# Patient Record
Sex: FEMALE | Race: WHITE | Employment: FULL TIME | URBAN - METROPOLITAN AREA
[De-identification: names, ages, dates, MRNs, and addresses within clinical notes are randomized per-mention and may not be internally consistent; named-entity substitution may affect disease eponyms.]

---

## 2017-02-08 ENCOUNTER — HOSPITAL ENCOUNTER (OUTPATIENT)
Dept: LAB | Age: 27
Discharge: HOME | End: 2017-02-08
Payer: COMMERCIAL

## 2017-02-08 ENCOUNTER — TELEPHONE (OUTPATIENT)
Dept: FAMILY MEDICINE CLINIC | Age: 27
End: 2017-02-08

## 2017-02-08 DIAGNOSIS — R61 NIGHT SWEATS: Primary | ICD-10-CM

## 2017-02-08 DIAGNOSIS — E04.9 ENLARGED THYROID: ICD-10-CM

## 2017-02-08 DIAGNOSIS — R61 NIGHT SWEATS: ICD-10-CM

## 2017-02-08 LAB — TSH SERPL DL<=0.05 MIU/L-ACNC: 1.1 UIU/ML (ref 0.36–3.74)

## 2017-02-08 PROCEDURE — 84443 ASSAY THYROID STIM HORMONE: CPT | Performed by: FAMILY MEDICINE

## 2017-05-23 ENCOUNTER — TELEPHONE (OUTPATIENT)
Dept: FAMILY MEDICINE CLINIC | Age: 27
End: 2017-05-23

## 2017-05-23 DIAGNOSIS — M25.572 ACUTE LEFT ANKLE PAIN: Primary | ICD-10-CM

## 2017-06-19 ENCOUNTER — TELEPHONE (OUTPATIENT)
Dept: FAMILY MEDICINE CLINIC | Age: 27
End: 2017-06-19

## 2017-06-19 ENCOUNTER — CLINICAL SUPPORT (OUTPATIENT)
Dept: FAMILY MEDICINE CLINIC | Age: 27
End: 2017-06-19

## 2017-06-19 ENCOUNTER — HOSPITAL ENCOUNTER (OUTPATIENT)
Dept: LAB | Age: 27
Discharge: HOME | End: 2017-06-19
Payer: SELF-PAY

## 2017-06-19 DIAGNOSIS — Z01.818 PRE-OP TESTING: Primary | ICD-10-CM

## 2017-06-19 DIAGNOSIS — Z01.818 PRE-OP TESTING: ICD-10-CM

## 2017-06-19 LAB
ANION GAP BLD CALC-SCNC: 8 MMOL/L (ref 3–18)
BUN SERPL-MCNC: 12 MG/DL (ref 7–18)
BUN/CREAT SERPL: 15 (ref 12–20)
CALCIUM SERPL-MCNC: 9.1 MG/DL (ref 8.5–10.1)
CHLORIDE SERPL-SCNC: 105 MMOL/L (ref 100–108)
CO2 SERPL-SCNC: 24 MMOL/L (ref 21–32)
CREAT SERPL-MCNC: 0.82 MG/DL (ref 0.6–1.3)
ERYTHROCYTE [DISTWIDTH] IN BLOOD BY AUTOMATED COUNT: 12.3 % (ref 11.6–14.5)
GLUCOSE SERPL-MCNC: 81 MG/DL (ref 74–99)
HCT VFR BLD AUTO: 42.2 % (ref 35–45)
HGB BLD-MCNC: 14.1 G/DL (ref 12–16)
MCH RBC QN AUTO: 31.6 PG (ref 24–34)
MCHC RBC AUTO-ENTMCNC: 33.4 G/DL (ref 31–37)
MCV RBC AUTO: 94.6 FL (ref 74–97)
PLATELET # BLD AUTO: 216 K/UL (ref 135–420)
PMV BLD AUTO: 11.2 FL (ref 9.2–11.8)
POTASSIUM SERPL-SCNC: 4.5 MMOL/L (ref 3.5–5.5)
RBC # BLD AUTO: 4.46 M/UL (ref 4.2–5.3)
SODIUM SERPL-SCNC: 137 MMOL/L (ref 136–145)
WBC # BLD AUTO: 9.6 K/UL (ref 4.6–13.2)

## 2017-06-19 PROCEDURE — 85027 COMPLETE CBC AUTOMATED: CPT | Performed by: FAMILY MEDICINE

## 2017-06-19 PROCEDURE — 80048 BASIC METABOLIC PNL TOTAL CA: CPT | Performed by: FAMILY MEDICINE

## 2017-06-19 PROCEDURE — 36415 COLL VENOUS BLD VENIPUNCTURE: CPT | Performed by: FAMILY MEDICINE

## 2017-08-07 ENCOUNTER — HOSPITAL ENCOUNTER (OUTPATIENT)
Dept: ULTRASOUND IMAGING | Age: 27
Discharge: HOME | End: 2017-08-07
Attending: FAMILY MEDICINE
Payer: COMMERCIAL

## 2017-08-07 DIAGNOSIS — R61 NIGHT SWEATS: ICD-10-CM

## 2017-08-07 DIAGNOSIS — E04.9 ENLARGED THYROID: ICD-10-CM

## 2017-08-07 PROCEDURE — 76536 US EXAM OF HEAD AND NECK: CPT

## 2017-08-17 DIAGNOSIS — F41.9 ANXIETY: ICD-10-CM

## 2017-08-17 RX ORDER — ALPRAZOLAM 1 MG/1
1 TABLET ORAL
Qty: 30 TAB | Refills: 0 | Status: SHIPPED | OUTPATIENT
Start: 2017-08-17 | End: 2017-12-21 | Stop reason: SDUPTHER

## 2017-11-28 ENCOUNTER — TELEPHONE (OUTPATIENT)
Dept: FAMILY MEDICINE CLINIC | Age: 27
End: 2017-11-28

## 2017-11-28 DIAGNOSIS — E04.9 GOITER, EUTHYROID: Primary | ICD-10-CM

## 2017-11-29 LAB
T4 FREE SERPL-MCNC: 1.3 NG/DL (ref 0.9–1.8)
TSH SERPL-ACNC: 1.46 MCU/ML (ref 0.27–4.2)

## 2017-12-11 ENCOUNTER — OFFICE VISIT (OUTPATIENT)
Dept: FAMILY MEDICINE CLINIC | Age: 27
End: 2017-12-11

## 2017-12-11 VITALS
SYSTOLIC BLOOD PRESSURE: 110 MMHG | OXYGEN SATURATION: 100 % | RESPIRATION RATE: 17 BRPM | BODY MASS INDEX: 28.17 KG/M2 | DIASTOLIC BLOOD PRESSURE: 66 MMHG | HEIGHT: 64 IN | WEIGHT: 165 LBS | TEMPERATURE: 97.7 F | HEART RATE: 82 BPM

## 2017-12-11 DIAGNOSIS — R11.2 NON-INTRACTABLE VOMITING WITH NAUSEA, UNSPECIFIED VOMITING TYPE: Primary | ICD-10-CM

## 2017-12-11 DIAGNOSIS — R10.9 ABDOMINAL CRAMPING: ICD-10-CM

## 2017-12-11 RX ORDER — KETOROLAC TROMETHAMINE 30 MG/ML
30 INJECTION, SOLUTION INTRAMUSCULAR; INTRAVENOUS ONCE
Qty: 1 VIAL | Refills: 0
Start: 2017-12-11 | End: 2017-12-11

## 2017-12-11 NOTE — PROGRESS NOTES
HISTORY OF PRESENT ILLNESS  Marizol Tirado is a 33 y.o. female who presents to center for nausea and vomiting since this morning. She woke up about 3 am this morning with being very nauseaus and lower abdominal cramping and moderate epigastric pain. She had a very loose BM that lasted about 15 minutes and shortly afterwards she vomited. She has not had any more BMs since then but has continued the constantly mildly nauseous with intermittent frequent bouts of increased nausea that caused her to vomit. She thinks that she has vomited 6 or 7 times since this morning and the last time she vomited was almost 2 hours ago. Several the time that she vomited when she had drank some water previously in order to keep hydrated. Denies being exposed to any welts that she is aware of that has the same symptoms or has been sick, has not eaten any different foods that she would normally eat or eaten at different restaurant in the last 24 hours. Nausea    The history is provided by the patient. The current episode started 3 to 5 hours ago. The problem has not changed since onset. The emesis has an appearance of bilious material. There has been no fever. Associated symptoms include abdominal pain (mild abdominal cramping pain that gets worse when very nauseous prior to vomiting). Pertinent negatives include no fever. The patient is not pregnant. Visit Vitals    /66 (BP 1 Location: Right arm, BP Patient Position: Sitting)  Comment: manual    Pulse 82    Temp 97.7 °F (36.5 °C) (Oral)    Resp 17    Ht 5' 4\" (1.626 m)    Wt 165 lb (74.8 kg)    LMP 12/10/2017 (Exact Date)    SpO2 100%    BMI 28.32 kg/m2     Past Medical History:   Diagnosis Date    Anxiety     Depression     Eating disorder      Current Outpatient Prescriptions on File Prior to Visit   Medication Sig Dispense Refill    ALPRAZolam (XANAX) 1 mg tablet Take 1 Tab by mouth nightly as needed for Anxiety.  Max Daily Amount: 1 mg. 30 Tab 0    hydrOXYzine (ATARAX) 25 mg tablet Take one tab at hs/PRN 30 Tab 2     No current facility-administered medications on file prior to visit. No current facility-administered medications on file prior to visit. Allergies   Allergen Reactions    Amoxicillin Rash       Review of Systems   Constitutional: Negative for fever. Respiratory: Negative for shortness of breath. Cardiovascular: Negative for chest pain and palpitations. Gastrointestinal: Positive for abdominal pain (mild abdominal cramping pain that gets worse when very nauseous prior to vomiting), nausea and vomiting. Genitourinary: Negative for dysuria and urgency. Physical Exam   Constitutional:   appears tired with mild pallor; no distress   Cardiovascular: Normal rate, regular rhythm, normal heart sounds and intact distal pulses. Pulmonary/Chest: Effort normal and breath sounds normal.   Abdominal: Soft. She exhibits no distension. There is tenderness (mid upper). ASSESSMENT and PLAN    ICD-10-CM ICD-9-CM    1. Non-intractable vomiting with nausea, unspecified vomiting type R11.2 787.01    2. Abdominal cramping R10.9 789.00 ketorolac (TORADOL) 30 mg/mL (1 mL) injection     Patient given Toradol injection to help with the abdominal discomfort/cramping so when she gets home that she is able to not have any increased nausea or vomiting after she takes Zofran that she has at home. She is to make sure she can still hydrate herself and she is to return to center if symptoms are not resolving in 24-48 hours. Any worsening symptoms she is to go to the emergency department. Patient verbalized understanding and agreed with plan.

## 2017-12-21 DIAGNOSIS — F41.9 ANXIETY: ICD-10-CM

## 2017-12-21 RX ORDER — ALPRAZOLAM 1 MG/1
1 TABLET ORAL
Qty: 30 TAB | Refills: 2 | Status: SHIPPED | OUTPATIENT
Start: 2017-12-21 | End: 2018-05-24 | Stop reason: SDUPTHER

## 2017-12-21 NOTE — TELEPHONE ENCOUNTER
Requested Prescriptions     Pending Prescriptions Disp Refills    ALPRAZolam (XANAX) 1 mg tablet 30 Tab 2     Sig: Take 1 Tab by mouth nightly as needed for Anxiety. Max Daily Amount: 1 mg.

## 2018-01-09 ENCOUNTER — OFFICE VISIT (OUTPATIENT)
Dept: FAMILY MEDICINE CLINIC | Age: 28
End: 2018-01-09

## 2018-01-09 VITALS
OXYGEN SATURATION: 96 % | HEART RATE: 113 BPM | WEIGHT: 159 LBS | SYSTOLIC BLOOD PRESSURE: 138 MMHG | HEIGHT: 64 IN | TEMPERATURE: 96.3 F | RESPIRATION RATE: 17 BRPM | BODY MASS INDEX: 27.14 KG/M2 | DIASTOLIC BLOOD PRESSURE: 77 MMHG

## 2018-01-09 DIAGNOSIS — R11.15 INTRACTABLE CYCLICAL VOMITING WITH NAUSEA: ICD-10-CM

## 2018-01-09 DIAGNOSIS — Z11.3 SCREENING FOR STD (SEXUALLY TRANSMITTED DISEASE): ICD-10-CM

## 2018-01-09 DIAGNOSIS — F41.9 ANXIETY: Primary | ICD-10-CM

## 2018-01-09 PROBLEM — F33.9 RECURRENT DEPRESSION (HCC): Status: ACTIVE | Noted: 2018-01-09

## 2018-01-09 RX ORDER — ONDANSETRON 8 MG/1
8 TABLET, ORALLY DISINTEGRATING ORAL
Qty: 10 TAB | Refills: 1 | Status: SHIPPED | OUTPATIENT
Start: 2018-01-09 | End: 2018-09-21

## 2018-01-09 RX ORDER — FLUOXETINE HYDROCHLORIDE 20 MG/1
20 CAPSULE ORAL DAILY
Qty: 30 CAP | Refills: 2 | Status: SHIPPED | OUTPATIENT
Start: 2018-01-09 | End: 2018-09-21

## 2018-01-09 RX ORDER — LAMOTRIGINE 25 MG/1
25 TABLET ORAL DAILY
Qty: 30 TAB | Refills: 2 | Status: SHIPPED | OUTPATIENT
Start: 2018-01-09 | End: 2018-09-21

## 2018-01-09 NOTE — PATIENT INSTRUCTIONS
Anxiety Disorder: Care Instructions  Your Care Instructions    Anxiety is a normal reaction to stress. Difficult situations can cause you to have symptoms such as sweaty palms and a nervous feeling. In an anxiety disorder, the symptoms are far more severe. Constant worry, muscle tension, trouble sleeping, nausea and diarrhea, and other symptoms can make normal daily activities difficult or impossible. These symptoms may occur for no reason, and they can affect your work, school, or social life. Medicines, counseling, and self-care can all help. Follow-up care is a key part of your treatment and safety. Be sure to make and go to all appointments, and call your doctor if you are having problems. It's also a good idea to know your test results and keep a list of the medicines you take. How can you care for yourself at home? · Take medicines exactly as directed. Call your doctor if you think you are having a problem with your medicine. · Go to your counseling sessions and follow-up appointments. · Recognize and accept your anxiety. Then, when you are in a situation that makes you anxious, say to yourself, \"This is not an emergency. I feel uncomfortable, but I am not in danger. I can keep going even if I feel anxious. \"  · Be kind to your body:  ¨ Relieve tension with exercise or a massage. ¨ Get enough rest.  ¨ Avoid alcohol, caffeine, nicotine, and illegal drugs. They can increase your anxiety level and cause sleep problems. ¨ Learn and do relaxation techniques. See below for more about these techniques. · Engage your mind. Get out and do something you enjoy. Go to a funny movie, or take a walk or hike. Plan your day. Having too much or too little to do can make you anxious. · Keep a record of your symptoms. Discuss your fears with a good friend or family member, or join a support group for people with similar problems. Talking to others sometimes relieves stress.   · Get involved in social groups, or volunteer to help others. Being alone sometimes makes things seem worse than they are. · Get at least 30 minutes of exercise on most days of the week to relieve stress. Walking is a good choice. You also may want to do other activities, such as running, swimming, cycling, or playing tennis or team sports. Relaxation techniques  Do relaxation exercises 10 to 20 minutes a day. You can play soothing, relaxing music while you do them, if you wish. · Tell others in your house that you are going to do your relaxation exercises. Ask them not to disturb you. · Find a comfortable place, away from all distractions and noise. · Lie down on your back, or sit with your back straight. · Focus on your breathing. Make it slow and steady. · Breathe in through your nose. Breathe out through either your nose or mouth. · Breathe deeply, filling up the area between your navel and your rib cage. Breathe so that your belly goes up and down. · Do not hold your breath. · Breathe like this for 5 to 10 minutes. Notice the feeling of calmness throughout your whole body. As you continue to breathe slowly and deeply, relax by doing the following for another 5 to 10 minutes:  · Tighten and relax each muscle group in your body. You can begin at your toes and work your way up to your head. · Imagine your muscle groups relaxing and becoming heavy. · Empty your mind of all thoughts. · Let yourself relax more and more deeply. · Become aware of the state of calmness that surrounds you. · When your relaxation time is over, you can bring yourself back to alertness by moving your fingers and toes and then your hands and feet and then stretching and moving your entire body. Sometimes people fall asleep during relaxation, but they usually wake up shortly afterward. · Always give yourself time to return to full alertness before you drive a car or do anything that might cause an accident if you are not fully alert.  Never play a relaxation tape while you drive a car. When should you call for help? Call 911 anytime you think you may need emergency care. For example, call if:  ? · You feel you cannot stop from hurting yourself or someone else. ? Keep the numbers for these national suicide hotlines: 7-953-771-TALK (6-762-632--336-420-6023) and 2-151-IQFRGBQ (2-335.332.7976). If you or someone you know talks about suicide or feeling hopeless, get help right away. ? Watch closely for changes in your health, and be sure to contact your doctor if:  ? · You have anxiety or fear that affects your life. ? · You have symptoms of anxiety that are new or different from those you had before. Where can you learn more? Go to http://sarah beth-aylin.info/. Enter P754 in the search box to learn more about \"Anxiety Disorder: Care Instructions. \"  Current as of: May 12, 2017  Content Version: 11.4  © 5540-3530 [employer]. Care instructions adapted under license by [employer] (which disclaims liability or warranty for this information). If you have questions about a medical condition or this instruction, always ask your healthcare professional. [address] any warranty or liability for your use of this information.

## 2018-01-09 NOTE — PROGRESS NOTES
Christina Fung is a 33 y.o. female presents to office for anxiety      1.  Have you been to the ER, urgent care clinic or hospitalized since your last visit? no          Health Maintenance items with a due date reviewed with patient:  Health Maintenance Due   Topic Date Due    HPV AGE 9Y-34Y (1 of 3 - Female 3 Dose Series) 04/12/2002    DTaP/Tdap/Td series (2 - Td) 07/22/2015    PAP AKA CERVICAL CYTOLOGY  06/24/2017    Influenza Age 9 to Adult  08/01/2017

## 2018-01-10 NOTE — PROGRESS NOTES
Randi Zhu is a 33 y.o.  female and presents with signs and symptoms of possible cyclic vomiting syndrome with nausea since   Childhood. She also is F/U for anxiety, depression. Chief Complaint   Patient presents with    Anxiety    Vomiting     Subjective: Additional Concerns: none     Patient Active Problem List    Diagnosis Date Noted    Recurrent depression ([address] 01/09/2018    Tongue plaque 09/14/2016    Anxiety 05/12/2016    Depression 05/12/2016    Weight gain 05/12/2016     Current Outpatient Prescriptions   Medication Sig Dispense Refill    FLUoxetine (PROZAC) 20 mg capsule Take 1 Cap by mouth daily. Indications: ANXIETY WITH DEPRESSION 30 Cap 2    lamoTRIgine (LAMICTAL) 25 mg tablet Take 1 Tab by mouth daily. Indications: anxiety 30 Tab 2    ondansetron (ZOFRAN ODT) 8 mg disintegrating tablet Take 1 Tab by mouth every twelve (12) hours as needed for Nausea. 10 Tab 1    ALPRAZolam (XANAX) 1 mg tablet Take 1 Tab by mouth nightly as needed for Anxiety.  Max Daily Amount: 1 mg. 30 Tab 2     Allergies   Allergen Reactions    Amoxicillin Rash     Past Medical History:   Diagnosis Date    Anxiety     Depression     Eating disorder      Past Surgical History:   Procedure Laterality Date    HX ANKLE FRACTURE TX Left     HX PELVIC LAPAROSCOPY      HX WISDOM TEETH EXTRACTION       Family History   Problem Relation Age of Onset    Cancer Maternal Grandmother     Cancer Maternal Grandfather     Cancer Paternal Grandmother     Cancer Paternal Grandfather      Social History   Substance Use Topics    Smoking status: Former Smoker     Types: Cigarettes    Smokeless tobacco: Never Used    Alcohol use 0.0 oz/week     0 Standard drinks or equivalent per week     ROS     General: negative for - chills, fatigue, fever, weight change  Psych: positive for - anxiety, depression, irritability or mood swings  Resp: negative for - cough, shortness of breath or wheezing  CV: negative for - chest pain, edema or palpitations  GI: negative for - abdominal pain, change in bowel habits, constipation, diarrhea, positive nausea/vomiting    Objective:  Vitals:    01/09/18 1232   BP: 138/77   Pulse: (!) 113   Resp: 17   Temp: 96.3 °F (35.7 °C)   TempSrc: Oral   SpO2: 96%   Weight: 159 lb (72.1 kg)   Height: 5' 4\" (1.626 m)   PainSc:   0 - No pain   LMP: 12/10/2017     PE    Alert, well appearing, and in no distress, oriented to person, place, and time and overweight  General appearance - alert, well appearing, and in no distress, oriented to person, place, and time and overweight  Mental status - alert, oriented to person, place, and time, normal mood, behavior, speech, dress, motor activity, and thought processes  Chest - clear to auscultation, no wheezes, rales or rhonchi, symmetric air entry  Heart - normal rate, regular rhythm, normal S1, S2, no murmurs, rubs, clicks or gallops  Extremities - peripheral pulses normal, no pedal edema, no clubbing or cyanosis    LABS   Orders Only on 11/28/2017   Component Date Value Ref Range Status    T4, Free 11/28/2017 1.3  0.9 - 1.8 ng/dL Final    TSH 11/28/2017 1.46  0.27 - 4.20 mcU/mL Final    Test includes TSH. T4 Free and T3 Free will be tested as needed. TESTS  Results for orders placed or performed in visit on 11/28/17   T4, FREE   Result Value Ref Range    T4, Free 1.3 0.9 - 1.8 ng/dL   THYROID CASCADE PROFILE   Result Value Ref Range    TSH 1.46 0.27 - 4.20 mcU/mL     Assessment/Plan:      1. Anxiety/depression - Restart  - FLUoxetine (PROZAC) 20 mg capsule; Take 1 Cap by mouth daily. Indications: ANXIETY WITH DEPRESSION  Dispense: 30 Cap; Refill: 2  Trial of  - lamoTRIgine (LAMICTAL) 25 mg tablet; Take 1 Tab by mouth daily. Indications: anxiety  Dispense: 30 Tab; Refill: 2    2. Intractable cyclical vomiting with nausea, possible cyclic vomiting syndrome - As above also. - ondansetron (ZOFRAN ODT) 8 mg disintegrating tablet;  Take 1 Tab by mouth every twelve (12) hours as needed for Nausea. Dispense: 10 Tab; Refill: 1  Patient will see GI for possible CVS work up and ruling out other differential from GI standpoint. Lab review: no lab studies available for review at time of visit. I have discussed the diagnosis with the patient and the intended plan as seen in the above orders. The patient has received an after-visit summary and questions were answered concerning future plans. I have discussed medication side effects and warnings with the patient as well. I have reviewed the plan of care with the patient, accepted their input and they are in agreement with the treatment goals. Follow-up Disposition:  Return in about 1 month (around 2/9/2018), or if symptoms worsen or fail to improve.     Negra Telles MD

## 2018-01-23 DIAGNOSIS — Z11.3 SCREENING FOR STD (SEXUALLY TRANSMITTED DISEASE): ICD-10-CM

## 2018-01-29 LAB
CHLAMYDIA AMPLIFIED URINE: NEGATIVE
GC AMPLIFIED URINE,919: NEGATIVE
HCV AB SER IA-ACNC: NORMAL
HEP B SURFACE AG SCRN, 006510: NORMAL
HIV -1/0/2 AG/AB WITH REFLEX, 13463: NON REACTIVE
HIV 1 & 2 AB SER-IMP: NORMAL
TREPONEMA PALLIDUM AB: NON REACTIVE

## 2018-03-29 ENCOUNTER — OFFICE VISIT (OUTPATIENT)
Dept: FAMILY MEDICINE CLINIC | Age: 28
End: 2018-03-29

## 2018-03-29 DIAGNOSIS — R00.0 TACHYCARDIA: Primary | ICD-10-CM

## 2018-03-29 NOTE — PROGRESS NOTES
HISTORY OF PRESENT ILLNESS  Bradley Bruno is a 33 y.o. female here for the evaluation of tachycardia. Patient states that she developed tachycardia yesterday while sitting at desk. Heart rate was as fast as 136 bpm.  She became diaphoretic. There was no chest pain or shortness of breath. Patient states that  this happens approximately once weekly for the past 2 years. Palpitations    The history is provided by the patient. This is a recurrent problem. The problem has not changed since onset. The problem occurs every several days. The problem is associated with an unknown factor. Associated symptoms include diaphoresis, malaise/fatigue and numbness. Pertinent negatives include no chest pain, no chest pressure, no exertional chest pressure, no irregular heartbeat, no near-syncope, no nausea, no vomiting, no headaches, no dizziness, no hemoptysis and no shortness of breath. Associated symptoms comments: He complains of numbness in her teeth during episode. Risk factors include smoking/tobacco exposure and family history. She has tried prescription medication (Xanax) for the symptoms. The treatment provided moderate relief. Her past medical history does not include hyperthyroidism, valve disorder, anemia, heart disease, DM, past MI, hypertension, atrial fibrillation, SVT, congenital heart disease or drug use. Allergies   Allergen Reactions    Amoxicillin Rash     Current Outpatient Prescriptions on File Prior to Visit   Medication Sig Dispense Refill    FLUoxetine (PROZAC) 20 mg capsule Take 1 Cap by mouth daily. Indications: ANXIETY WITH DEPRESSION 30 Cap 2    lamoTRIgine (LAMICTAL) 25 mg tablet Take 1 Tab by mouth daily. Indications: anxiety 30 Tab 2    ondansetron (ZOFRAN ODT) 8 mg disintegrating tablet Take 1 Tab by mouth every twelve (12) hours as needed for Nausea. 10 Tab 1    ALPRAZolam (XANAX) 1 mg tablet Take 1 Tab by mouth nightly as needed for Anxiety.  Max Daily Amount: 1 mg. 30 Tab 2     No current facility-administered medications on file prior to visit. Past Medical History:   Diagnosis Date    Anxiety     Depression     Eating disorder      Past Surgical History:   Procedure Laterality Date    HX ANKLE FRACTURE TX Left     HX PELVIC LAPAROSCOPY      HX WISDOM TEETH EXTRACTION       Family History   Problem Relation Age of Onset    Cancer Maternal Grandmother     Cancer Maternal Grandfather     Cancer Paternal Grandmother     Cancer Paternal Grandfather      Social History     Social History    Marital status: UNKNOWN     Spouse name: N/A    Number of children: N/A    Years of education: N/A     Occupational History    Not on file. Social History Main Topics    Smoking status: Former Smoker     Types: Cigarettes    Smokeless tobacco: Never Used    Alcohol use 0.0 oz/week     0 Standard drinks or equivalent per week    Drug use: No    Sexual activity: Yes     Birth control/ protection: Pill     Other Topics Concern    Not on file     Social History Narrative       Review of Systems   Constitutional: Positive for diaphoresis and malaise/fatigue. Eyes: Negative. Respiratory: Negative. Negative for hemoptysis and shortness of breath. Cardiovascular: Positive for palpitations. Negative for chest pain and near-syncope. Gastrointestinal: Negative for nausea and vomiting. Musculoskeletal: Negative. Neurological: Positive for numbness. Negative for dizziness and headaches. Endo/Heme/Allergies: Negative. Psychiatric/Behavioral: Negative. Visit Vitals    /74 (BP 1 Location: Left arm, BP Patient Position: Sitting)    Pulse (!) 130    Temp 98.6 °F (37 °C) (Oral)    Resp 16    Ht 5' 4\" (1.626 m)    Wt 150 lb (68 kg)    SpO2 98%    BMI 25.75 kg/m2       Physical Exam   Constitutional: She is oriented to person, place, and time. She appears well-developed and well-nourished. HENT:   Head: Normocephalic and atraumatic.    Cardiovascular: Normal rate, regular rhythm, normal heart sounds and intact distal pulses. Exam reveals no gallop and no friction rub. No murmur heard. Pulmonary/Chest: Effort normal and breath sounds normal. No respiratory distress. She has no wheezes. She has no rales. Musculoskeletal: Normal range of motion. She exhibits no edema, tenderness or deformity. Neurological: She is alert and oriented to person, place, and time. No cranial nerve deficit. Coordination normal.   Skin: Skin is warm and dry. No rash noted. No erythema. No pallor. Psychiatric: She has a normal mood and affect. Her behavior is normal. Judgment and thought content normal.   Nursing note and vitals reviewed. ASSESSMENT and PLAN    ICD-10-CM ICD-9-CM    1. Tachycardia R00.0 785.0 EVENT MONITOR POST SYMPTOMS      CBC WITH AUTOMATED DIFF      TSH 3RD GENERATION      REFERRAL TO CARDIOLOGY     Follow-up Disposition:  Return if symptoms worsen or fail to improve.

## 2018-04-02 VITALS
BODY MASS INDEX: 25.61 KG/M2 | WEIGHT: 150 LBS | DIASTOLIC BLOOD PRESSURE: 74 MMHG | RESPIRATION RATE: 16 BRPM | HEIGHT: 64 IN | HEART RATE: 130 BPM | SYSTOLIC BLOOD PRESSURE: 120 MMHG | OXYGEN SATURATION: 98 % | TEMPERATURE: 98.6 F

## 2018-04-02 PROBLEM — R00.0 TACHYCARDIA: Status: ACTIVE | Noted: 2018-04-02

## 2018-04-03 ENCOUNTER — TELEPHONE (OUTPATIENT)
Dept: CARDIOLOGY CLINIC | Age: 28
End: 2018-04-03

## 2018-04-03 LAB
ABSOLUTE LYMPHOCYTE COUNT, 10803: 3.7 K/UL (ref 1–4.8)
BASOPHILS # BLD: 0 K/UL (ref 0–0.2)
BASOPHILS NFR BLD: 0 % (ref 0–2)
EOSINOPHIL # BLD: 0.1 K/UL (ref 0–0.5)
EOSINOPHIL NFR BLD: 1 % (ref 0–6)
ERYTHROCYTE [DISTWIDTH] IN BLOOD BY AUTOMATED COUNT: 13.2 % (ref 10–15.5)
GRANULOCYTES,GRANS: 68 % (ref 40–75)
HCT VFR BLD AUTO: 44.9 % (ref 35.1–46.5)
HGB BLD-MCNC: 14.9 G/DL (ref 11.7–15.5)
LYMPHOCYTES, LYMLT: 24 % (ref 20–45)
MCH RBC QN AUTO: 32 PG (ref 26–34)
MCHC RBC AUTO-ENTMCNC: 33 G/DL (ref 31–36)
MCV RBC AUTO: 97 FL (ref 80–95)
MONOCYTES # BLD: 1.1 K/UL (ref 0.1–1)
MONOCYTES NFR BLD: 7 % (ref 3–12)
NEUTROPHILS # BLD AUTO: 10.4 K/UL (ref 1.8–7.7)
PLATELET # BLD AUTO: 231 K/UL (ref 140–440)
PMV BLD AUTO: 11.5 FL (ref 9–13)
RBC # BLD AUTO: 4.63 M/UL (ref 3.8–5.2)
TSH SERPL DL<=0.005 MIU/L-ACNC: 2.3 MCU/ML (ref 0.27–4.2)
WBC # BLD AUTO: 15.3 K/UL (ref 4–11)

## 2018-04-10 ENCOUNTER — TELEPHONE (OUTPATIENT)
Dept: FAMILY MEDICINE CLINIC | Age: 28
End: 2018-04-10

## 2018-04-17 ENCOUNTER — TELEPHONE (OUTPATIENT)
Dept: CARDIOLOGY CLINIC | Age: 28
End: 2018-04-17

## 2018-04-17 NOTE — TELEPHONE ENCOUNTER
Patient called and states that she had abnormal EKG and that Dr. Bebe Huynh would like her to have a 30 day event monitor and see Dr. Brian Orellana for follow up. She has been waiting to hear from company for event monitor but has not as of now. Verbal order and read back per Iraj De La Rosa MD  [facility] to order 30 day event monitor through prevent so that he will see results and follow up with him once event [place] is completed.      Patient aware and enrollment is set up

## 2018-05-24 DIAGNOSIS — F41.9 ANXIETY: ICD-10-CM

## 2018-05-24 RX ORDER — ALPRAZOLAM 1 MG/1
1 TABLET ORAL
Qty: 60 TAB | Refills: 2 | Status: SHIPPED | OUTPATIENT
Start: 2018-05-24 | End: 2018-12-18 | Stop reason: SDUPTHER

## 2018-05-24 NOTE — TELEPHONE ENCOUNTER
Can you please sign order    Requested Prescriptions     Pending Prescriptions Disp Refills    ALPRAZolam (XANAX) 1 mg tablet 60 Tab 2     Sig: Take 1 Tab by mouth two (2) times daily as needed for Anxiety. Max Daily Amount: 2 mg.

## 2018-07-03 ENCOUNTER — HOSPITAL ENCOUNTER (OUTPATIENT)
Dept: ULTRASOUND IMAGING | Age: 28
Discharge: HOME | End: 2018-07-03
Attending: OBSTETRICS & GYNECOLOGY
Payer: COMMERCIAL

## 2018-07-03 DIAGNOSIS — N63.10 UNSPECIFIED LUMP IN THE RIGHT BREAST, UNSPECIFIED QUADRANT: ICD-10-CM

## 2018-07-03 PROCEDURE — 76642 ULTRASOUND BREAST LIMITED: CPT

## 2018-07-03 RX ORDER — FLUCONAZOLE 150 MG/1
150 TABLET ORAL DAILY
Qty: 3 TAB | Refills: 1 | Status: SHIPPED | OUTPATIENT
Start: 2018-07-03 | End: 2018-07-04

## 2018-07-03 NOTE — TELEPHONE ENCOUNTER
Please order    Requested Prescriptions     Pending Prescriptions Disp Refills    fluconazole (DIFLUCAN) 150 mg tablet 3 Tab 1     Sig: Take 1 Tab by mouth daily for 1 day. FDA advises cautious prescribing of oral fluconazole in pregnancy.

## 2018-07-20 ENCOUNTER — HOSPITAL ENCOUNTER (OUTPATIENT)
Dept: MAMMOGRAPHY | Age: 28
Discharge: HOME | End: 2018-07-20
Attending: OBSTETRICS & GYNECOLOGY
Payer: COMMERCIAL

## 2018-07-20 ENCOUNTER — HOSPITAL ENCOUNTER (OUTPATIENT)
Dept: ULTRASOUND IMAGING | Age: 28
Discharge: HOME | End: 2018-07-20
Attending: OBSTETRICS & GYNECOLOGY
Payer: COMMERCIAL

## 2018-07-20 DIAGNOSIS — Z98.890 STATUS POST RIGHT BREAST BIOPSY: ICD-10-CM

## 2018-07-20 DIAGNOSIS — N63.10 BREAST MASS, RIGHT: ICD-10-CM

## 2018-07-20 PROCEDURE — 77030020003 US BX BREAST VAC RT 1ST LESION W/CLIP AND SPECIMEN

## 2018-07-20 PROCEDURE — 74011000250 HC RX REV CODE- 250: Performed by: RADIOLOGY

## 2018-07-20 PROCEDURE — 77065 DX MAMMO INCL CAD UNI: CPT

## 2018-07-20 PROCEDURE — 88305 TISSUE EXAM BY PATHOLOGIST: CPT | Performed by: OBSTETRICS & GYNECOLOGY

## 2018-07-20 RX ORDER — LIDOCAINE HYDROCHLORIDE 10 MG/ML
10 INJECTION INFILTRATION; PERINEURAL
Status: COMPLETED | OUTPATIENT
Start: 2018-07-20 | End: 2018-07-20

## 2018-07-20 RX ORDER — LIDOCAINE HYDROCHLORIDE AND EPINEPHRINE 10; 10 MG/ML; UG/ML
10 INJECTION, SOLUTION INFILTRATION; PERINEURAL
Status: COMPLETED | OUTPATIENT
Start: 2018-07-20 | End: 2018-07-20

## 2018-07-20 RX ADMIN — LIDOCAINE HYDROCHLORIDE 4 ML: 10 INJECTION, SOLUTION INFILTRATION; PERINEURAL at 09:10

## 2018-07-20 RX ADMIN — LIDOCAINE HYDROCHLORIDE,EPINEPHRINE BITARTRATE 10 ML: 10; .01 INJECTION, SOLUTION INFILTRATION; PERINEURAL at 09:24

## 2018-07-26 RX ORDER — LAMOTRIGINE 100 MG/1
100 TABLET ORAL DAILY
Qty: 90 TAB | Refills: 1 | Status: SHIPPED | OUTPATIENT
Start: 2018-07-26 | End: 2019-02-05

## 2018-09-04 ENCOUNTER — OFFICE VISIT (OUTPATIENT)
Dept: FAMILY MEDICINE CLINIC | Age: 28
End: 2018-09-04

## 2018-09-04 DIAGNOSIS — R31.9 URINARY TRACT INFECTION WITH HEMATURIA, SITE UNSPECIFIED: ICD-10-CM

## 2018-09-04 DIAGNOSIS — N39.0 URINARY TRACT INFECTION WITH HEMATURIA, SITE UNSPECIFIED: ICD-10-CM

## 2018-09-04 DIAGNOSIS — M54.40 MIDLINE LOW BACK PAIN WITH SCIATICA, SCIATICA LATERALITY UNSPECIFIED, UNSPECIFIED CHRONICITY: Primary | ICD-10-CM

## 2018-09-04 DIAGNOSIS — N89.8 VAGINAL DISCHARGE: ICD-10-CM

## 2018-09-04 LAB
BILIRUB UR QL STRIP: NEGATIVE
GLUCOSE UR-MCNC: NEGATIVE MG/DL
KETONES P FAST UR STRIP-MCNC: NEGATIVE MG/DL
PH UR STRIP: 5.5 [PH] (ref 4.6–8)
PROT UR QL STRIP: NORMAL
SP GR UR STRIP: 1.03 (ref 1–1.03)
UA UROBILINOGEN AMB POC: NORMAL (ref 0.2–1)
URINALYSIS CLARITY POC: CLEAR
URINALYSIS COLOR POC: YELLOW
URINE BLOOD POC: NORMAL
URINE LEUKOCYTES POC: NORMAL
URINE NITRITES POC: NEGATIVE

## 2018-09-04 RX ORDER — SULFAMETHOXAZOLE AND TRIMETHOPRIM 800; 160 MG/1; MG/1
1 TABLET ORAL 2 TIMES DAILY
Qty: 14 TAB | Refills: 0 | Status: SHIPPED | OUTPATIENT
Start: 2018-09-04 | End: 2018-09-11

## 2018-09-04 NOTE — PROGRESS NOTES
Iris Pal is a 33 y.o. female presents to office for BACK PAIN    Medication list has been reviewed with Iris Pal and updated as of today's date     Health Maintenance items with a due date reviewed with patient:  Health Maintenance Due   Topic Date Due    DTaP/Tdap/Td series (2 - Td) 07/22/2015    PAP AKA CERVICAL CYTOLOGY  06/24/2017    Influenza Age 9 to Adult  08/01/2018

## 2018-09-05 LAB
CHLAMYDIA AMPLIFIED URINE: NEGATIVE
GC AMPLIFIED URINE,919: NEGATIVE

## 2018-09-05 NOTE — PROGRESS NOTES
Elda Ramos is a 33 y.o.  female and presents with a day hx of low back pain mostly right sided with vaginal discharge that is baseline. Patient is sexually active with one partner. Pinkish tinged urine but not currently on her period. Chief Complaint   Patient presents with    Back Pain     Subjective: Additional Concerns: none    Patient Active Problem List    Diagnosis Date Noted    Tachycardia 04/02/2018    Recurrent depression ([address] 01/09/2018    Tongue plaque 09/14/2016    Anxiety 05/12/2016    Depression 05/12/2016    Weight gain 05/12/2016     Current Outpatient Prescriptions   Medication Sig Dispense Refill    trimethoprim-sulfamethoxazole (BACTRIM DS, SEPTRA DS) 160-800 mg per tablet Take 1 Tab by mouth two (2) times a day for 7 days. 14 Tab 0    lamoTRIgine (LAMICTAL) 100 mg tablet Take 1 Tab by mouth daily. Indications: DEPRESSION ASSOCIATED WITH BIPOLAR DISORDER 90 Tab 1    ALPRAZolam (XANAX) 1 mg tablet Take 1 Tab by mouth two (2) times daily as needed for Anxiety. Max Daily Amount: 2 mg. 60 Tab 2    FLUoxetine (PROZAC) 20 mg capsule Take 1 Cap by mouth daily. Indications: ANXIETY WITH DEPRESSION 30 Cap 2    lamoTRIgine (LAMICTAL) 25 mg tablet Take 1 Tab by mouth daily. Indications: anxiety 30 Tab 2    ondansetron (ZOFRAN ODT) 8 mg disintegrating tablet Take 1 Tab by mouth every twelve (12) hours as needed for Nausea.  10 Tab 1     Allergies   Allergen Reactions    Amoxicillin Rash     Past Medical History:   Diagnosis Date    Anxiety     Depression     Eating disorder      Past Surgical History:   Procedure Laterality Date    HX ANKLE FRACTURE TX Left     HX BREAST BIOPSY Right 07/20/2018    Ultrasound Biopsy    HX PELVIC LAPAROSCOPY      HX WISDOM TEETH EXTRACTION       Family History   Problem Relation Age of Onset    Cancer Maternal Grandmother     Cancer Maternal Grandfather     Cancer Paternal Grandmother     Cancer Paternal Grandfather     Breast Cancer Paternal Aunt     Breast Cancer Paternal Aunt     Breast Cancer Paternal Aunt     Breast Cancer Paternal Aunt      Social History   Substance Use Topics    Smoking status: Former Smoker     Types: Cigarettes    Smokeless tobacco: Never Used    Alcohol use 0.0 oz/week     0 Standard drinks or equivalent per week     ROS     General: negative for - chills, fatigue, fever, weight change  Psych: negative for - anxiety, depression, irritability or mood swings  ENT: negative for - headaches, hearing change, nasal congestion, oral lesions, sneezing or sore throat  Heme/ Lymph: negative for - bleeding problems, bruising, pallor or swollen lymph nodes  Endo: negative for - hot flashes, polydipsia/polyuria or temperature intolerance  Resp: negative for - cough, shortness of breath or wheezing  CV: negative for - chest pain, edema or palpitations  GI: negative for - abdominal pain, change in bowel habits, constipation, diarrhea or nausea/vomiting  : negative for - dysuria, hematuria, incontinence, pelvic pain or vulvar/vaginal symptoms  MSK: negative for - joint pain, joint swelling or muscle pain  Neuro: negative for - confusion, headaches, seizures or weakness  Derm: negative for - dry skin, hair changes, rash or skin lesion changes    Objective:    PE    Alert, well appearing, and in no distress, oriented to person, place, and time and normal appearing weight  General appearance - alert, well appearing, and in no distress, oriented to person, place, and time and normal appearing weight  Mental status - alert, oriented to person, place, and time, normal mood, behavior, speech, dress, motor activity, and thought processes  No CVA tenderness bilateral    LABS   Office Visit on 09/04/2018   Component Date Value Ref Range Status    Color (UA POC) 09/04/2018 Yellow   Final    Clarity (UA POC) 09/04/2018 Clear   Final    Glucose (UA POC) 09/04/2018 Negative  Negative Final    Bilirubin (UA POC) 09/04/2018 Negative  Negative Final    Ketones (UA POC) 09/04/2018 Negative  Negative Final    Specific gravity (UA POC) 09/04/2018 1.030  1.001 - 1.035 Final    Blood (UA POC) 09/04/2018 3+  Negative Final    pH (UA POC) 09/04/2018 5.5  4.6 - 8.0 Final    Protein (UA POC) 09/04/2018 Trace  Negative Final    Urobilinogen (UA POC) 09/04/2018 0.2 mg/dL  0.2 - 1 Final    Nitrites (UA POC) 09/04/2018 Negative  Negative Final    Leukocyte esterase (UA POC) 09/04/2018 1+  Negative Final   Office Visit on 03/29/2018   Component Date Value Ref Range Status    TSH 04/02/2018 2.30  0.27 - 4.20 mcU/mL Final    WBC 04/02/2018 15.3* 4.0 - 11.0 K/uL Final    RBC 04/02/2018 4.63  3.80 - 5.20 M/uL Final    HGB 04/02/2018 14.9  11.7 - 15.5 g/dL Final    HCT 04/02/2018 44.9  35.1 - 46.5 % Final    MCV 04/02/2018 97* 80 - 95 fL Final    MCH 04/02/2018 32  26 - 34 pg Final    MCHC 04/02/2018 33  31 - 36 g/dL Final    RDW 04/02/2018 13.2  10.0 - 15.5 % Final    PLATELET 24/33/5987 842  140 - 440 K/uL Final    MPV 04/02/2018 11.5  9.0 - 13.0 fL Final    NEUTROPHILS 04/02/2018 68  40 - 75 % Final    Lymphocytes 04/02/2018 24  20 - 45 % Final    MONOCYTES 04/02/2018 7  3 - 12 % Final    EOSINOPHILS 04/02/2018 1  0 - 6 % Final    BASOPHILS 04/02/2018 0  0 - 2 % Final    ABS. NEUTROPHILS 04/02/2018 10.4* 1.8 - 7.7 K/uL Final    ABSOLUTE LYMPHOCYTE COUNT 04/02/2018 3.7  1.0 - 4.8 K/uL Final    ABS. MONOCYTES 04/02/2018 1.1* 0.1 - 1.0 K/uL Final    ABS. EOSINOPHILS 04/02/2018 0.1  0.0 - 0.5 K/uL Final    ABS.  BASOPHILS 04/02/2018 0.0  0.0 - 0.2 K/uL Final       TESTS  Results for orders placed or performed in visit on 09/04/18   AMB POC URINALYSIS DIP STICK AUTO W/O MICRO   Result Value Ref Range    Color (UA POC) Yellow     Clarity (UA POC) Clear     Glucose (UA POC) Negative Negative    Bilirubin (UA POC) Negative Negative    Ketones (UA POC) Negative Negative    Specific gravity (UA POC) 1.030 1.001 - 1.035    Blood (UA POC) 3+ Negative    pH (UA POC) 5.5 4.6 - 8.0    Protein (UA POC) Trace Negative    Urobilinogen (UA POC) 0.2 mg/dL 0.2 - 1    Nitrites (UA POC) Negative Negative    Leukocyte esterase (UA POC) 1+ Negative     Assessment/Plan:      1. Midline low back pain with sciatica, sciatica laterality unspecified, unspecified chronicity  - AMB POC URINALYSIS DIP STICK AUTO W/O MICRO    2. Urinary tract infection with hematuria, site unspecified first time apparently - empiric treatment with   - trimethoprim-sulfamethoxazole (BACTRIM DS, SEPTRA DS) 160-800 mg per tablet; Take 1 Tab by mouth two (2) times a day for 7 days. Dispense: 14 Tab; Refill: 0    3. Vaginal discharge  - CHLAMYDIA/NEISSERIA AMPLIFICATION; Future    Lab review: orders written for new lab studies as appropriate; see orders. I have discussed the diagnosis with the patient and the intended plan as seen in the above orders. The patient has received an after-visit summary and questions were answered concerning future plans. I have discussed medication side effects and warnings with the patient as well. I have reviewed the plan of care with the patient, accepted their input and they are in agreement with the treatment goals. F/U as needed.      Candace Brasher MD

## 2018-09-05 NOTE — PATIENT INSTRUCTIONS
Back Pain: Care Instructions  Your Care Instructions    Back pain has many possible causes. It is often related to problems with muscles and ligaments of the back. It may also be related to problems with the nerves, discs, or bones of the back. Moving, lifting, standing, sitting, or sleeping in an awkward way can strain the back. Sometimes you don't notice the injury until later. Arthritis is another common cause of back pain. Although it may hurt a lot, back pain usually improves on its own within several weeks. Most people recover in 12 weeks or less. Using good home treatment and being careful not to stress your back can help you feel better sooner. Follow-up care is a key part of your treatment and safety. Be sure to make and go to all appointments, and call your doctor if you are having problems. It's also a good idea to know your test results and keep a list of the medicines you take. How can you care for yourself at home? · Sit or lie in positions that are most comfortable and reduce your pain. Try one of these positions when you lie down:  ¨ Lie on your back with your knees bent and supported by large pillows. ¨ Lie on the floor with your legs on the seat of a sofa or chair. Jaciel Frames on your side with your knees and hips bent and a pillow between your legs. ¨ Lie on your stomach if it does not make pain worse. · Do not sit up in bed, and avoid soft couches and twisted positions. Bed rest can help relieve pain at first, but it delays healing. Avoid bed rest after the first day of back pain. · Change positions every 30 minutes. If you must sit for long periods of time, take breaks from sitting. Get up and walk around, or lie in a comfortable position. · Try using a heating pad on a low or medium setting for 15 to 20 minutes every 2 or 3 hours. Try a warm shower in place of one session with the heating pad. · You can also try an ice pack for 10 to 15 minutes every 2 to 3 hours.  Put a thin cloth between the ice pack and your skin. · Take pain medicines exactly as directed. ¨ If the doctor gave you a prescription medicine for pain, take it as prescribed. ¨ If you are not taking a prescription pain medicine, ask your doctor if you can take an over-the-counter medicine. · Take short walks several times a day. You can start with 5 to 10 minutes, 3 or 4 times a day, and work up to longer walks. Walk on level surfaces and avoid hills and stairs until your back is better. · Return to work and other activities as soon as you can. Continued rest without activity is usually not good for your back. · To prevent future back pain, do exercises to stretch and strengthen your back and stomach. Learn how to use good posture, safe lifting techniques, and proper body mechanics. When should you call for help? Call your doctor now or seek immediate medical care if:    · You have new or worsening numbness in your legs.     · You have new or worsening weakness in your legs. (This could make it hard to stand up.)     · You lose control of your bladder or bowels.    Watch closely for changes in your health, and be sure to contact your doctor if:    · You have a fever, lose weight, or don't feel well.     · You do not get better as expected. Where can you learn more? Go to http://sarah beth-aylin.info/. Enter V083 in the search box to learn more about \"Back Pain: Care Instructions. \"  Current as of: November 29, 2017  Content Version: 11.7  © 4498-5137 "[employer]. Care instructions adapted under license by [employer] (which disclaims liability or warranty for this information). If you have questions about a medical condition or this instruction, always ask your healthcare professional. [place] [address] any warranty or liability for your use of this information.        Flank Pain: Care Instructions  Your Care Instructions  Flank pain is pain on the side of the back just below the rib cage and above the waist. It can be on one or both sides. Flank pain has many possible causes, including a kidney stone, a urinary tract infection, or back strain. Flank pain may get better on its own. But don't ignore new symptoms, such as fever, nausea and vomiting, urination problems, pain that gets worse, and dizziness. These may be signs of a more serious problem. You may have to have tests or other treatment. Follow-up care is a key part of your treatment and safety. Be sure to make and go to all appointments, and call your doctor if you are having problems. It's also a good idea to know your test results and keep a list of the medicines you take. How can you care for yourself at home? · Rest until you feel better. · Take pain medicines exactly as directed. ¨ If the doctor gave you a prescription medicine for pain, take it as prescribed. ¨ If you are not taking a prescription pain medicine, ask your doctor if you can take an over-the-counter pain medicine, such as acetaminophen (Tylenol), ibuprofen (Advil, Motrin), or naproxen (Aleve). Read and follow all instructions on the label. · If your doctor prescribed antibiotics, take them as directed. Do not stop taking them just because you feel better. You need to take the full course of antibiotics. · To apply heat, put a warm water bottle, a heating pad set on low, or a warm cloth on the painful area. Do not go to sleep with a heating pad on your skin. · To prevent dehydration, drink plenty of fluids, enough so that your urine is light yellow or clear like water. Choose water and other caffeine-free clear liquids until you feel better. If you have kidney, heart, or liver disease and have to limit fluids, talk with your doctor before you increase the amount of fluids you drink. When should you call for help?   Call your doctor now or seek immediate medical care if:    · Your flank pain gets worse.     · You have new symptoms, such as fever, nausea, or vomiting.     · You have symptoms of a urinary problem. For example:  ¨ You have blood or pus in your urine. ¨ You have chills or body aches. ¨ It hurts to urinate. ¨ You have groin or belly pain.    Watch closely for changes in your health, and be sure to contact your doctor if you do not get better as expected. Where can you learn more? Go to http://sarah beth-aylin.info/. Enter S191 in the search box to learn more about \"Flank Pain: Care Instructions. \"  Current as of: November 20, 2017  Content Version: 11.7  © 1363-2542 [employer]. Care instructions adapted under license by [employer] (which disclaims liability or warranty for this information). If you have questions about a medical condition or this instruction, always ask your healthcare professional. [address] any warranty or liability for your use of this information. Blood in the Urine: Care Instructions  Your Care Instructions    Blood in the urine, or hematuria, may make the urine look red, brown, or pink. There may be blood every time you urinate or just from time to time. You cannot always see blood in the urine, but it will show up in a urine test.  Blood in the urine may be serious. It should always be checked by a doctor. Your doctor may recommend more tests, including an X-ray, a CT scan, or a cystoscopy (which lets a doctor look inside the urethra and bladder). Blood in the urine can be a sign of another problem. Common causes are bladder infections and kidney stones. An injury to your groin or your genital area can also cause bleeding in the urinary tract. Very hard exercise-such as running a marathon-can cause blood in the urine. Blood in the urine can also be a sign of kidney disease or cancer in the bladder or kidney. Many cases of blood in the urine are caused by a harmless condition that runs in families. This is called benign familial hematuria. It does not need any treatment. Sometimes your urine may look red or brown even though it does not contain blood. For example, not getting enough fluids (dehydration), taking certain medicines, or having a liver problem can change the color of your urine. Eating foods such as beets, rhubarb, or blackberries or foods with red food coloring can make your urine look red or pink. Follow-up care is a key part of your treatment and safety. Be sure to make and go to all appointments, and call your doctor if you are having problems. It's also a good idea to know your test results and keep a list of the medicines you take. When should you call for help? Call your doctor now or seek immediate medical care if:    · You have symptoms of a urinary infection. For example:  ¨ You have pus in your urine. ¨ You have pain in your back just below your rib cage. This is called flank pain. ¨ You have a fever, chills, or body aches. ¨ It hurts to urinate. ¨ You have groin or belly pain.     · You have more blood in your urine.    Watch closely for changes in your health, and be sure to contact your doctor if:    · You have new urination problems.     · You do not get better as expected. Where can you learn more? Go to http://sarah beth-aylin.info/. Enter J319 in the search box to learn more about \"Blood in the Urine: Care Instructions. \"  Current as of: May 12, 2017  Content Version: 11.7  © 7094-8401 [employer]. Care instructions adapted under license by [employer] (which disclaims liability or warranty for this information). If you have questions about a medical condition or this instruction, always ask your healthcare professional. [place] [address] any warranty or liability for your use of this information.

## 2018-09-21 ENCOUNTER — OFFICE VISIT (OUTPATIENT)
Dept: INTERNAL MEDICINE CLINIC | Age: 28
End: 2018-09-21

## 2018-09-21 VITALS
WEIGHT: 151 LBS | SYSTOLIC BLOOD PRESSURE: 132 MMHG | BODY MASS INDEX: 25.78 KG/M2 | RESPIRATION RATE: 18 BRPM | TEMPERATURE: 98.1 F | HEART RATE: 97 BPM | HEIGHT: 64 IN | OXYGEN SATURATION: 98 % | DIASTOLIC BLOOD PRESSURE: 74 MMHG

## 2018-09-21 DIAGNOSIS — F41.9 ANXIETY: Primary | ICD-10-CM

## 2018-09-21 DIAGNOSIS — R11.0 NAUSEA: ICD-10-CM

## 2018-09-21 DIAGNOSIS — F32.89 OTHER DEPRESSION: ICD-10-CM

## 2018-09-21 RX ORDER — ONDANSETRON HYDROCHLORIDE 8 MG/1
8 TABLET, FILM COATED ORAL
Qty: 30 TAB | Refills: 0 | Status: SHIPPED | OUTPATIENT
Start: 2018-09-21 | End: 2019-09-10 | Stop reason: SDUPTHER

## 2018-09-21 RX ORDER — BUSPIRONE HYDROCHLORIDE 7.5 MG/1
TABLET ORAL
Qty: 90 TAB | Refills: 0 | Status: SHIPPED | OUTPATIENT
Start: 2018-09-21 | End: 2018-10-19 | Stop reason: SDUPTHER

## 2018-09-21 NOTE — MR AVS SNAPSHOT
[address] [place] [address] [address] [address] 
[phone] Patient: Dean iMller MRN: [id number] :1990 Visit Information Date & Time Provider Department Dept. Phone Encounter #  
 2018  2:15 PM Jose Sheffield PA-C [facility] [phone] [id number] Follow-up Instructions Return in about 4 weeks (around 10/19/2018) for depression/anxiety. Upcoming Health Maintenance Date Due DTaP/Tdap/Td series (2 - Td) 2015 PAP AKA CERVICAL CYTOLOGY 2017 Influenza Age 5 to Adult 2018 Allergies as of 2018  Review Complete On: 2018 By: Jose Sheffield PA-C Severity Noted Reaction Type Reactions Amoxicillin  03/10/2016    Rash Current Immunizations  Never Reviewed Name Date Hep A Vaccine 2010, 2004 Hep B Vaccine 2002, 10/29/2001, 2001 Influenza Vaccine 10/7/2013, 2012, 2010, 10/21/2009 Meningococcal (MCV4) Vaccine 6/3/2008 Meningococcal ACWY Vaccine 2006 Tdap 2005 Not reviewed this visit You Were Diagnosed With   
  
 Codes Comments Anxiety    -  Primary ICD-10-CM: F41.9 ICD-9-CM: 300.00 Other depression     ICD-10-CM: F32.89 ICD-9-CM: 503 Nausea     ICD-10-CM: R11.0 ICD-9-CM: 787.02 Vitals BP Pulse Temp Resp Height(growth percentile) Weight(growth percentile) 132/74 (BP 1 Location: Left arm, BP Patient Position: Sitting) 97 98.1 °F (36.7 °C) (Oral) 18 5' 3.5\" (1.613 m) 151 lb (68.5 kg) LMP SpO2 BMI OB Status Smoking Status 2018 (Exact Date) 98% 26.33 kg/m2 Having regular periods Former Smoker Vitals History BMI and BSA Data Body Mass Index Body Surface Area  
 26.33 kg/m 2 1.75 m 2 Preferred Pharmacy Pharmacy Name Phone [id number] [place] [facility], [address] [phone] Your Updated Medication List  
  
   
This list is accurate as of 18 10:28 PM.  Always use your most recent med list.  
  
  
  
  
 ALPRAZolam 1 mg tablet Commonly known as:  Patsy Hearing Take 1 Tab by mouth two (2) times daily as needed for Anxiety. Max Daily Amount: 2 mg.  
  
 busPIRone 7.5 mg tablet Commonly known as:  BUSPAR  
1 tab bid for 2 weeks then increase to 1.5 tabs bid  
  
 lamoTRIgine 100 mg tablet Commonly known as: LaMICtal  
Take 1 Tab by mouth daily. Indications: DEPRESSION ASSOCIATED WITH BIPOLAR DISORDER  
  
 ondansetron hcl 8 mg tablet Commonly known as:  Radha Houston Take 1 Tab by mouth every eight (8) hours as needed for Nausea. Prescriptions Sent to Pharmacy Refills  
 busPIRone (BUSPAR) 7.5 mg tablet 0 Si tab bid for 2 weeks then increase to 1.5 tabs bid Class: Normal  
 Pharmacy: [facility], [address] [place] [address] Ph #: [phone]  
 ondansetron hcl (ZOFRAN) 8 mg tablet 0 Sig: Take 1 Tab by mouth every eight (8) hours as needed for Nausea. Class: Normal  
 Pharmacy: [address] [facility], [address] [facility] Ph #: [phone] Route: Oral  
  
Follow-up Instructions Return in about 4 weeks (around 10/19/2018) for depression/anxiety. Patient Instructions Anxiety Disorder: Care Instructions Your Care Instructions Anxiety is a normal reaction to stress. Difficult situations can cause you to have symptoms such as sweaty palms and a nervous feeling. In an anxiety disorder, the symptoms are far more severe. Constant worry, muscle tension, trouble sleeping, nausea and diarrhea, and other symptoms can make normal daily activities difficult or impossible. These symptoms may occur for no reason, and they can affect your work, school, or social life. Medicines, counseling, and self-care can all help. Follow-up care is a key part of your treatment and safety.  Be sure to make and go to all appointments, and call your doctor if you are having problems. It's also a good idea to know your test results and keep a list of the medicines you take. How can you care for yourself at home? · Take medicines exactly as directed. Call your doctor if you think you are having a problem with your medicine. · Go to your counseling sessions and follow-up appointments. · Recognize and accept your anxiety. Then, when you are in a situation that makes you anxious, say to yourself, \"This is not an emergency. I feel uncomfortable, but I am not in danger. I can keep going even if I feel anxious. \" · Be kind to your body: ¨ Relieve tension with exercise or a massage. ¨ Get enough rest. 
¨ Avoid alcohol, caffeine, nicotine, and illegal drugs. They can increase your anxiety level and cause sleep problems. ¨ Learn and do relaxation techniques. See below for more about these techniques. · Engage your mind. Get out and do something you enjoy. Go to a funny movie, or take a walk or hike. Plan your day. Having too much or too little to do can make you anxious. · Keep a record of your symptoms. Discuss your fears with a good friend or family member, or join a support group for people with similar problems. Talking to others sometimes relieves stress. · Get involved in social groups, or volunteer to help others. Being alone sometimes makes things seem worse than they are. · Get at least 30 minutes of exercise on most days of the week to relieve stress. Walking is a good choice. You also may want to do other activities, such as running, swimming, cycling, or playing tennis or team sports. Relaxation techniques Do relaxation exercises 10 to 20 minutes a day. You can play soothing, relaxing music while you do them, if you wish. · Tell others in your house that you are going to do your relaxation exercises. Ask them not to disturb you. · Find a comfortable place, away from all distractions and noise. · Lie down on your back, or sit with your back straight. · Focus on your breathing. Make it slow and steady. · Breathe in through your nose. Breathe out through either your nose or mouth. · Breathe deeply, filling up the area between your navel and your rib cage. Breathe so that your belly goes up and down. · Do not hold your breath. · Breathe like this for 5 to 10 minutes. Notice the feeling of calmness throughout your whole body. As you continue to breathe slowly and deeply, relax by doing the following for another 5 to 10 minutes: · Tighten and relax each muscle group in your body. You can begin at your toes and work your way up to your head. · Imagine your muscle groups relaxing and becoming heavy. · Empty your mind of all thoughts. · Let yourself relax more and more deeply. · Become aware of the state of calmness that surrounds you. · When your relaxation time is over, you can bring yourself back to alertness by moving your fingers and toes and then your hands and feet and then stretching and moving your entire body. Sometimes people fall asleep during relaxation, but they usually wake up shortly afterward. · Always give yourself time to return to full alertness before you drive a car or do anything that might cause an accident if you are not fully alert. Never play a relaxation tape while you drive a car. When should you call for help? Call 911 anytime you think you may need emergency care. For example, call if: 
  · You feel you cannot stop from hurting yourself or someone else.  
Freddie Ball the numbers for these national suicide hotlines: 8-095-526-TALK (5-807.417.9392) and 3-184-THSNKKI (6-382.335.1101). If you or someone you know talks about suicide or feeling hopeless, get help right away. 
 Watch closely for changes in your health, and be sure to contact your doctor if: 
  · You have anxiety or fear that affects your life.   · You have symptoms of anxiety that are new or different from those you had before. Where can you learn more? Go to http://sarah beth-aylin.info/. Enter P754 in the search box to learn more about \"Anxiety Disorder: Care Instructions. \" Current as of: December 7, 2017 Content Version: 11.7 © 9173-4150 [employer]. Care instructions adapted under license by [employer] (which disclaims liability or warranty for this information). If you have questions about a medical condition or this instruction, always ask your healthcare professional. [address] any warranty or liability for your use of this information. Introducing [facility]! Dear Sweetie Ang: Thank you for requesting a [employer] account. Our records indicate that you already have an active [employer] account. You can access your account anytime at [email or web address] Did you know that you can access your hospital and ER discharge instructions at any time in [employer]? You can also review all of your test results from your hospital stay or ER visit. Additional Information If you have questions, please visit the Frequently Asked Questions section of the [employer] website at [email or web address] Remember, [employer] is NOT to be used for urgent needs. For medical emergencies, dial 911. Now available from your iPhone and Android! Please provide this summary of care documentation to your next provider. Your primary care clinician is listed as Patricia Walden. If you have any questions after today's visit, please call [phone].

## 2018-09-21 NOTE — PROGRESS NOTES
Chief Complaint Patient presents with  Anxiety NP c/o anxiety and depression. Current medicaitons not helping. Was seeing atherapist, had an argument and is not going back. 1. Have you been to the ER, urgent care clinic since your last visit? Hospitalized since your last visit? No 
 
2. Have you seen or consulted any other health care providers outside of the [facility] since your last visit? Include any pap smears or colon screening.  Yes When: Therapy, Breast Specilaist for Lump Left Breast, GYN

## 2018-09-22 NOTE — PROGRESS NOTES
HISTORY OF PRESENT ILLNESS Rob Benton is a 33 y.o. female. HPI Pt is here to establish care and is concerned with her anxiety and depression. She has a hx of this for many yrs and has been in therapy but is not relating well with her current psychiatrist. She wants to continue with her therapist but would like to come here for her meds. She has had thoughts of suicide recently but denies a plan and has never had a hx of an attempt. She currently is on lamictal but does not feel it has helped. She takes xanax prn but does not want it regularly. She has been on wellbutrin which caused more anxiety as well as prozac and effexor which she had a bad reaction to. She has never tried buspar. Will wean pt off lamictal and start buspar. Will keep xanax prn for now. Allergies Allergen Reactions  Amoxicillin Rash Current Outpatient Prescriptions Medication Sig  
 busPIRone (BUSPAR) 7.5 mg tablet 1 tab bid for 2 weeks then increase to 1.5 tabs bid  ondansetron hcl (ZOFRAN) 8 mg tablet Take 1 Tab by mouth every eight (8) hours as needed for Nausea.  lamoTRIgine (LAMICTAL) 100 mg tablet Take 1 Tab by mouth daily. Indications: DEPRESSION ASSOCIATED WITH BIPOLAR DISORDER  ALPRAZolam (XANAX) 1 mg tablet Take 1 Tab by mouth two (2) times daily as needed for Anxiety. Max Daily Amount: 2 mg. No current facility-administered medications for this visit. Review of Systems Constitutional: Negative. Negative for chills, fever and malaise/fatigue. HENT: Negative. Negative for congestion, ear pain, sore throat and tinnitus. Eyes: Negative. Negative for blurred vision, double vision and photophobia. Respiratory: Negative. Negative for cough, shortness of breath and wheezing. Cardiovascular: Negative. Negative for chest pain, palpitations and leg swelling. Gastrointestinal: Positive for nausea (from anxiety). Negative for abdominal pain, heartburn and vomiting. Genitourinary: Negative. Negative for dysuria, frequency, hematuria and urgency. Musculoskeletal: Negative. Negative for back pain, joint pain, myalgias and neck pain. Skin: Negative. Negative for itching and rash. Neurological: Negative. Negative for dizziness, tingling, tremors and headaches. Psychiatric/Behavioral: Positive for depression. Negative for hallucinations, memory loss, substance abuse and suicidal ideas. The patient is nervous/anxious and has insomnia. Visit Vitals  /74 (BP 1 Location: Left arm, BP Patient Position: Sitting)  Pulse 97  Temp 98.1 °F (36.7 °C) (Oral)  Resp 18  Ht 5' 3.5\" (1.613 m)  Wt 151 lb (68.5 kg)  SpO2 98%  BMI 26.33 kg/m2 Physical Exam  
Constitutional: She is oriented to person, place, and time. She appears well-developed and well-nourished. No distress. Pt is overweight Cardiovascular: Normal rate, regular rhythm and normal heart sounds. Pulmonary/Chest: Effort normal and breath sounds normal.  
Neurological: She is alert and oriented to person, place, and time. Skin: Skin is warm and dry. She is not diaphoretic. Psychiatric: Her speech is normal and behavior is normal. Judgment and thought content normal. Her mood appears anxious. Thought content is not paranoid. Cognition and memory are normal. She exhibits a depressed mood. She expresses no homicidal and no suicidal ideation. ASSESSMENT and PLAN 
  ICD-10-CM ICD-9-CM 1. Anxiety F41.9 300.00 busPIRone (BUSPAR) 7.5 mg tablet 2. Other depression F32.89 311   
3. Nausea R11.0 787.02 ondansetron hcl (ZOFRAN) 8 mg tablet Follow-up Disposition: 
Return in about 4 weeks (around 10/19/2018) for depression/anxiety. Pt expressed understanding of visit summary and plans for any follow ups or referrals as well as any medications prescribed.

## 2018-09-22 NOTE — PATIENT INSTRUCTIONS

## 2018-10-19 DIAGNOSIS — F41.9 ANXIETY: ICD-10-CM

## 2018-10-22 RX ORDER — BUSPIRONE HYDROCHLORIDE 7.5 MG/1
TABLET ORAL
Qty: 90 TAB | Refills: 0 | Status: SHIPPED | OUTPATIENT
Start: 2018-10-22 | End: 2018-11-14 | Stop reason: SDUPTHER

## 2018-10-30 ENCOUNTER — TELEPHONE (OUTPATIENT)
Dept: INTERNAL MEDICINE CLINIC | Age: 28
End: 2018-10-30

## 2018-10-30 NOTE — TELEPHONE ENCOUNTER
Pt calling in stating that she is having nausea, vomitting, and generally not feeling well. Pt just started the 7.5 Buspar and thinks that may be what it is. She also stated that she forgot to tell Rohini Jameson that she is on birth control meds.   Pt is wondering if it could be medication interaction or just a virus; would like to discuss with Rohini Jameson or nurse

## 2018-10-31 NOTE — TELEPHONE ENCOUNTER
Spoke to pt and she has been on a new BCP for 4 months, took placebo pills and had heavy period. Feeling nauseated with heavy bleeding. OB GYN said she should be ok. Thought it to be the Buspar since increasing dose to 1.5 tabs BID a few weeks ago. Let her know if it was the Buspar she would have had symptoms sooner after increasing. She did say she was feeling a little better and the period is slowing down. Could be the heavy period making her feel bad. Give it a few more days and if no better she needs to come in and be evaluated. Pt verbalized understanding.

## 2018-12-18 DIAGNOSIS — F41.9 ANXIETY: ICD-10-CM

## 2018-12-18 RX ORDER — BUSPIRONE HYDROCHLORIDE 7.5 MG/1
TABLET ORAL
Qty: 90 TAB | Refills: 0 | Status: SHIPPED | OUTPATIENT
Start: 2018-12-18 | End: 2019-02-05 | Stop reason: DRUGHIGH

## 2018-12-18 RX ORDER — ALPRAZOLAM 1 MG/1
1 TABLET ORAL
Qty: 60 TAB | Refills: 2 | Status: SHIPPED | OUTPATIENT
Start: 2018-12-18 | End: 2018-12-19 | Stop reason: SDUPTHER

## 2018-12-19 DIAGNOSIS — F41.9 ANXIETY: ICD-10-CM

## 2018-12-19 RX ORDER — ALPRAZOLAM 1 MG/1
1 TABLET ORAL
Qty: 60 TAB | Refills: 2 | Status: SHIPPED | OUTPATIENT
Start: 2018-12-19 | End: 2019-07-10 | Stop reason: ALTCHOICE

## 2019-02-05 ENCOUNTER — OFFICE VISIT (OUTPATIENT)
Dept: INTERNAL MEDICINE CLINIC | Age: 29
End: 2019-02-05

## 2019-02-05 VITALS
OXYGEN SATURATION: 98 % | RESPIRATION RATE: 18 BRPM | TEMPERATURE: 98.9 F | SYSTOLIC BLOOD PRESSURE: 127 MMHG | DIASTOLIC BLOOD PRESSURE: 84 MMHG | HEART RATE: 94 BPM | WEIGHT: 147 LBS | HEIGHT: 64 IN | BODY MASS INDEX: 25.1 KG/M2

## 2019-02-05 DIAGNOSIS — N89.8 VAGINAL DISCHARGE: Primary | ICD-10-CM

## 2019-02-05 DIAGNOSIS — F41.9 ANXIETY: ICD-10-CM

## 2019-02-05 RX ORDER — BUSPIRONE HYDROCHLORIDE 15 MG/1
15 TABLET ORAL 3 TIMES DAILY
Qty: 90 TAB | Refills: 2 | Status: SHIPPED | OUTPATIENT
Start: 2019-02-05 | End: 2019-02-11

## 2019-02-05 RX ORDER — LEVONORGESTREL AND ETHINYL ESTRADIOL 0.1-0.02MG
KIT ORAL
COMMUNITY

## 2019-02-05 NOTE — PROGRESS NOTES
Chief Complaint   Patient presents with    Vaginal Discharge     3 weeks, started after last period.  Medication Evaluation     Discuss Buspar. 1. Have you been to the ER, urgent care clinic since your last visit? Hospitalized since your last visit? No    2. Have you seen or consulted any other health care providers outside of the [address] [place] [address] since your last visit? Include any pap smears or colon screening.  No

## 2019-02-08 LAB
BACTERIAL VAGINOSIS, NAA: POSITIVE
CANDIDA ALBICANS, NAA, 180056: NEGATIVE
CANDIDA GLABRATA, NAA, 180057: NEGATIVE
CANDIDA KRUSEI, NAA, 180016: NEGATIVE
CHLAMYDIA TRACHOMATIS, NAA, 180097: NEGATIVE
NEISSERIA GONORRHOEAE, NAA, 180104: NEGATIVE
TRICH VAG BY NAA, 180087: NEGATIVE

## 2019-02-08 RX ORDER — METRONIDAZOLE 500 MG/1
500 TABLET ORAL 2 TIMES DAILY
Qty: 20 TAB | Refills: 0 | Status: SHIPPED | OUTPATIENT
Start: 2019-02-08 | End: 2019-02-18

## 2019-02-08 RX ORDER — FLUCONAZOLE 150 MG/1
150 TABLET ORAL DAILY
Qty: 1 TAB | Refills: 0 | Status: SHIPPED | OUTPATIENT
Start: 2019-02-08 | End: 2019-02-09

## 2019-02-11 ENCOUNTER — TELEPHONE (OUTPATIENT)
Dept: INTERNAL MEDICINE CLINIC | Age: 29
End: 2019-02-11

## 2019-02-11 DIAGNOSIS — F41.9 ANXIETY: Primary | ICD-10-CM

## 2019-02-11 RX ORDER — BUSPIRONE HYDROCHLORIDE 7.5 MG/1
15 TABLET ORAL 3 TIMES DAILY
Qty: 180 TAB | Refills: 1 | Status: SHIPPED | OUTPATIENT
Start: 2019-02-11 | End: 2019-03-01 | Stop reason: SDUPTHER

## 2019-02-20 ENCOUNTER — TELEPHONE (OUTPATIENT)
Dept: INTERNAL MEDICINE CLINIC | Age: 29
End: 2019-02-20

## 2019-02-20 NOTE — TELEPHONE ENCOUNTER
Pt called c/o hives on back and chest. Only new thing is Flagyl that she has been on for 7 days and increasing her Buspar which she has been on for a while. Per Paulo Briones, stop Flagyl and take allergy med and see if hives clear up. Continue Buspar for now. Pt verbalized understanding.

## 2019-02-21 NOTE — PROGRESS NOTES
HISTORY OF PRESENT ILLNESS  Micki Hood is a 33 y.o. female. HPI   Pt c/o clear odorless vaginal discharge for the past 3 weeks. Denies dysuria, hematuria, fever, chills, flank pain, abd pain, pelvic pain, rash, urinary frequency, and N/V/D. She is in a relationship and has not been with anyone other than her boyfriend and feels he has been faithful but would like testing to be certain. She also is following up on her buspar and states it has been working well and she would like to continue at current dose. Allergies   Allergen Reactions    Amoxicillin Rash         Current Outpatient Medications   Medication Sig    levonorgestrel-ethinyl estradiol (LARISSIA) 0.1-20 mg-mcg tab Take  by mouth.  ALPRAZolam (XANAX) 1 mg tablet Take 1 Tab by mouth two (2) times daily as needed for Anxiety. Max Daily Amount: 2 mg.  ondansetron hcl (ZOFRAN) 8 mg tablet Take 1 Tab by mouth every eight (8) hours as needed for Nausea.  busPIRone (BUSPAR) 7.5 mg tablet Take 2 Tabs by mouth three (3) times daily. No current facility-administered medications for this visit. Review of Systems   Constitutional: Negative. Negative for chills, fever and malaise/fatigue. HENT: Negative. Negative for congestion, ear pain, sore throat and tinnitus. Eyes: Negative. Negative for blurred vision, double vision and photophobia. Respiratory: Negative. Negative for cough, shortness of breath and wheezing. Cardiovascular: Negative. Negative for chest pain, palpitations and leg swelling. Gastrointestinal: Negative. Negative for abdominal pain, heartburn, nausea and vomiting. Genitourinary: Negative for dysuria, frequency, hematuria and urgency. Vaginal discharge   Musculoskeletal: Negative. Negative for back pain, joint pain, myalgias and neck pain. Skin: Negative. Negative for itching and rash. Neurological: Negative. Negative for dizziness, tingling, tremors and headaches. Psychiatric/Behavioral: Positive for depression (controlled on meds). Negative for hallucinations, memory loss, substance abuse and suicidal ideas. The patient is nervous/anxious (controlled on meds). The patient does not have insomnia. Visit Vitals  /84 (BP 1 Location: Left arm, BP Patient Position: Sitting)   Pulse 94   Temp 98.9 °F (37.2 °C) (Oral)   Resp 18   Ht 5' 3.5\" (1.613 m)   Wt 147 lb (66.7 kg)   SpO2 98%   BMI 25.63 kg/m²       Physical Exam   Constitutional: She is oriented to person, place, and time. She appears well-developed and well-nourished. No distress. Pt is overweight   Cardiovascular: Normal rate, regular rhythm and normal heart sounds. Pulmonary/Chest: Effort normal and breath sounds normal.   Neurological: She is alert and oriented to person, place, and time. Skin: Skin is warm and dry. She is not diaphoretic. Psychiatric: She has a normal mood and affect. Her speech is normal and behavior is normal. Judgment and thought content normal. Her mood appears not anxious. Thought content is not paranoid. Cognition and memory are normal. She does not exhibit a depressed mood. She expresses no homicidal and no suicidal ideation. ASSESSMENT and PLAN    ICD-10-CM ICD-9-CM    1. Vaginal discharge N89.8 623.5 NUSWAB VAGINITIS PLUS      NUSWAB VAGINITIS PLUS      DISCONTINUED: busPIRone (BUSPAR) 15 mg tablet   2. Anxiety F41.9 300.00 levonorgestrel-ethinyl estradiol (LARISSIA) 0.1-20 mg-mcg tab     Follow-up Disposition:  Return if symptoms worsen or fail to improve. Pt expressed understanding of visit summary and plans for any follow ups or referrals as well as any medications prescribed.

## 2019-02-21 NOTE — PATIENT INSTRUCTIONS
Vaginitis: Care Instructions  Your Care Instructions    Vaginitis is soreness or infection of the vagina. This common problem can cause itching and burning. And it can cause a change in vaginal discharge. Sometimes it can cause pain during sex. Vaginitis may be caused by bacteria, yeast, or other germs. Some infections that cause it are caught from a sexual partner. Bath products, spermicides, and douches can irritate the vagina too. Some women have this problem during and after menopause. A drop in estrogen levels during this time can cause dryness, soreness, and pain during sex. Your doctor can give you medicine to treat an infection. And home care may help you feel better. For certain types of infections, your sex partner must be treated too. Follow-up care is a key part of your treatment and safety. Be sure to make and go to all appointments, and call your doctor if you are having problems. It's also a good idea to know your test results and keep a list of the medicines you take. How can you care for yourself at home? · If your doctor prescribed antibiotics, take them as directed. Do not stop taking them just because you feel better. You need to take the full course of antibiotics. · Take your medicines exactly as prescribed. Call your doctor if you think you are having a problem with your medicine. · Do not eat or drink anything that has alcohol if you are taking metronidazole (Flagyl). · If you have a yeast infection, use over-the-counter products as your doctor tells you to. Or take medicine your doctor prescribes exactly as directed. · Wash your vaginal area daily with water. You also can use a mild, unscented soap if you want. · Do not use scented bath products. And do not use vaginal sprays or douches. · Put a washcloth soaked in cool water on the area to relieve itching. Or you can take cool baths.   · If you have dryness because of menopause, use estrogen cream or pills that your doctor prescribes. · Ask your doctor about when it is okay to have sex. · Use a personal lubricant before sex if you have dryness. Examples are Astroglide, K-Y Jelly, and Wet Lubricant Gel. · Ask your doctor if your sex partner also needs treatment. When should you call for help? Call your doctor now or seek immediate medical care if:    · You have a fever and pelvic pain.    Watch closely for changes in your health, and be sure to contact your doctor if:    · You have bleeding other than your period.     · You do not get better as expected. Where can you learn more? Go to http://sarah beth-aylin.info/. Enter O359 in the search box to learn more about \"Vaginitis: Care Instructions. \"  Current as of: May 14, 2018  Content Version: 11.9  © 6149-3838 [employer]. Care instructions adapted under license by [employer] (which disclaims liability or warranty for this information). If you have questions about a medical condition or this instruction, always ask your healthcare professional. [address] any warranty or liability for your use of this information.

## 2019-02-27 ENCOUNTER — HOSPITAL ENCOUNTER (EMERGENCY)
Age: 29
Discharge: STILL ADMITTED OR ADMINISTRATIVE | End: 2019-02-27
Attending: EMERGENCY MEDICINE
Payer: COMMERCIAL

## 2019-02-27 VITALS
RESPIRATION RATE: 16 BRPM | WEIGHT: 145 LBS | SYSTOLIC BLOOD PRESSURE: 143 MMHG | DIASTOLIC BLOOD PRESSURE: 91 MMHG | OXYGEN SATURATION: 97 % | HEART RATE: 92 BPM | HEIGHT: 63 IN | TEMPERATURE: 98.8 F | BODY MASS INDEX: 25.69 KG/M2

## 2019-02-27 DIAGNOSIS — M25.572 ACUTE LEFT ANKLE PAIN: Primary | ICD-10-CM

## 2019-02-27 PROCEDURE — 75810000275 HC EMERGENCY DEPT VISIT NO LEVEL OF CARE

## 2019-02-27 NOTE — ED TRIAGE NOTES
\"I'm having pain in my ankle and its swollen. \" Reports multiple reconstructive surgery on that ankle, last in 2016.

## 2019-03-01 ENCOUNTER — OFFICE VISIT (OUTPATIENT)
Dept: INTERNAL MEDICINE CLINIC | Age: 29
End: 2019-03-01

## 2019-03-01 VITALS
HEART RATE: 79 BPM | HEIGHT: 63 IN | TEMPERATURE: 99.1 F | BODY MASS INDEX: 25.69 KG/M2 | WEIGHT: 145 LBS | SYSTOLIC BLOOD PRESSURE: 121 MMHG | OXYGEN SATURATION: 99 % | DIASTOLIC BLOOD PRESSURE: 76 MMHG | RESPIRATION RATE: 18 BRPM

## 2019-03-01 DIAGNOSIS — F41.9 ANXIETY: ICD-10-CM

## 2019-03-01 DIAGNOSIS — L29.9 ITCHING: Primary | ICD-10-CM

## 2019-03-01 RX ORDER — BUSPIRONE HYDROCHLORIDE 7.5 MG/1
15 TABLET ORAL 3 TIMES DAILY
Qty: 180 TAB | Refills: 1 | Status: SHIPPED | OUTPATIENT
Start: 2019-03-01 | End: 2019-10-16 | Stop reason: SDUPTHER

## 2019-03-01 RX ORDER — METHYLPREDNISOLONE 4 MG/1
TABLET ORAL
Qty: 1 DOSE PACK | Refills: 0 | Status: SHIPPED | OUTPATIENT
Start: 2019-03-01 | End: 2019-06-28 | Stop reason: ALTCHOICE

## 2019-03-01 NOTE — PROGRESS NOTES
HISTORY OF PRESENT ILLNESS  Coye Dandy is a 33 y.o. female. HPI  Ms. Amira Joshua is here for c/o itching on her hands and feet X 1 week. She began itching 7 days into a course of flagyl. She discontinued it, but the itching has continued. She has been taking benadryl which helps slightly, but when it wears off the itching returns. She can't think of any other exposures that would be causing her to itch. The only place she really had any rash was on her back, however itching was more diffuse. She denies any change in medications, soaps, clothing etc.   She is requesting a refill on buspar also. Review of Systems   Constitutional: Negative. Respiratory: Negative. Cardiovascular: Negative. Gastrointestinal: Negative. Skin: Positive for itching and rash. States her feet have turned red due to itching   Neurological: Negative. Psychiatric/Behavioral: The patient is nervous/anxious. Physical Exam   Constitutional: She is oriented to person, place, and time. She appears well-developed and well-nourished. No distress. HENT:   Head: Normocephalic and atraumatic. Cardiovascular: Normal rate and regular rhythm. No murmur heard. Pulmonary/Chest: Effort normal and breath sounds normal. She has no wheezes. Musculoskeletal: She exhibits no edema. Neurological: She is alert and oriented to person, place, and time. Psychiatric: She has a normal mood and affect. Her behavior is normal. Judgment and thought content normal.     Visit Vitals  /76 (BP 1 Location: Left arm, BP Patient Position: Sitting)   Pulse 79   Temp 99.1 °F (37.3 °C) (Oral)   Resp 18   Ht 5' 3\" (1.6 m)   Wt 145 lb (65.8 kg)   SpO2 99%   BMI 25.69 kg/m²     Discussed it is difficult to determine the cause of her rash. Discussed treatment options such as vistaril/atarax, steroids or topical steroids. There is the possibility of this being something like scabies.  I advised her to call me if her rash doesn't improve. ASSESSMENT and PLAN    ICD-10-CM ICD-9-CM    1. Itching L29.9 698.9 methylPREDNISolone (MEDROL, ANA PAULA,) 4 mg tablet   2. Anxiety F41.9 300.00 busPIRone (BUSPAR) 7.5 mg tablet     Pt verbalized understanding of their condition and diagnoses, treatment plan,  as well as side effects of any new medications prescribed.

## 2019-03-04 NOTE — PATIENT INSTRUCTIONS
Rash: Care Instructions  Your Care Instructions  A rash is any irritation or inflammation of the skin. Rashes have many possible causes, including allergy, infection, illness, heat, and emotional stress. Follow-up care is a key part of your treatment and safety. Be sure to make and go to all appointments, and call your doctor if you are having problems. It's also a good idea to know your test results and keep a list of the medicines you take. How can you care for yourself at home? · Wash the area with water only. Soap can make dryness and itching worse. Pat dry. · Put cold, wet cloths on the rash to reduce itching. · Keep cool, and stay out of the sun. · Leave the rash open to the air as much of the time as possible. · Sometimes petroleum jelly (Vaseline) can help relieve the discomfort caused by a rash. A moisturizing lotion, such as Cetaphil, also may help. Calamine lotion may help for rashes caused by contact with something (such as a plant or soap) that irritated the skin. Use it 3 or 4 times a day. · If your doctor prescribed a cream, use it as directed. If your doctor prescribed medicine, take it exactly as directed. · If your rash itches so badly that it interferes with your normal activities, take an over-the-counter antihistamine, such as diphenhydramine (Benadryl) or loratadine (Claritin). Read and follow all instructions on the label. When should you call for help? Call your doctor now or seek immediate medical care if:    · You have signs of infection, such as:  ? Increased pain, swelling, warmth, or redness. ? Red streaks leading from the area. ? Pus draining from the area. ? A fever.     · You have joint pain along with the rash.    Watch closely for changes in your health, and be sure to contact your doctor if:    · Your rash is changing or getting worse.  For example, call if you have pain along with the rash, the rash is spreading, or you have new blisters.     · You do not get better after 1 week. Where can you learn more? Go to http://sarah beth-aylin.info/. Enter J727 in the search box to learn more about \"Rash: Care Instructions. \"  Current as of: April 17, 2018  Content Version: 11.9  © 0268-0135 [employer]. Care instructions adapted under license by [employer] (which disclaims liability or warranty for this information). If you have questions about a medical condition or this instruction, always ask your healthcare professional. [address] any warranty or liability for your use of this information.

## 2019-03-20 ENCOUNTER — TELEPHONE (OUTPATIENT)
Dept: INTERNAL MEDICINE CLINIC | Age: 29
End: 2019-03-20

## 2019-03-20 DIAGNOSIS — B96.89 BV (BACTERIAL VAGINOSIS): Primary | ICD-10-CM

## 2019-03-20 DIAGNOSIS — N76.0 BV (BACTERIAL VAGINOSIS): Primary | ICD-10-CM

## 2019-03-20 NOTE — TELEPHONE ENCOUNTER
Pt called c/o S+S of BV. She was positive last month and then given Flagyl which she did not finish due to reaction to med. She said she was told by someone from here that there is a solution that she can take one time to clear up BV?

## 2019-03-26 NOTE — TELEPHONE ENCOUNTER
There is a coupon that overrides prior auth. And makes it $25. She can go online and print it or pick one up.  Otherwise there isn't really anything else

## 2019-03-26 NOTE — TELEPHONE ENCOUNTER
Spoke to pt about trying to use the savings card, she will look it up and try to use it. Any problems she will call the office.

## 2019-04-03 ENCOUNTER — TELEPHONE (OUTPATIENT)
Dept: INTERNAL MEDICINE CLINIC | Age: 29
End: 2019-04-03

## 2019-05-04 ENCOUNTER — HOSPITAL ENCOUNTER (OUTPATIENT)
Dept: MRI IMAGING | Age: 29
Discharge: HOME | End: 2019-05-04
Attending: ORTHOPAEDIC SURGERY
Payer: COMMERCIAL

## 2019-05-04 DIAGNOSIS — M25.372 ANKLE INSTABILITY, LEFT: ICD-10-CM

## 2019-05-04 DIAGNOSIS — M76.829 POSTERIOR TIBIAL TENDINITIS, UNSPECIFIED LATERALITY: ICD-10-CM

## 2019-05-04 PROCEDURE — 73721 MRI JNT OF LWR EXTRE W/O DYE: CPT

## 2019-06-28 ENCOUNTER — OFFICE VISIT (OUTPATIENT)
Dept: SURGERY | Age: 29
End: 2019-06-28

## 2019-06-28 ENCOUNTER — DOCUMENTATION ONLY (OUTPATIENT)
Dept: SURGERY | Age: 29
End: 2019-06-28

## 2019-06-28 VITALS
BODY MASS INDEX: 26.93 KG/M2 | HEIGHT: 63 IN | HEART RATE: 80 BPM | OXYGEN SATURATION: 99 % | WEIGHT: 152 LBS | SYSTOLIC BLOOD PRESSURE: 110 MMHG | RESPIRATION RATE: 18 BRPM | DIASTOLIC BLOOD PRESSURE: 69 MMHG | TEMPERATURE: 97.6 F

## 2019-06-28 DIAGNOSIS — N63.10 BREAST MASS, RIGHT: Primary | ICD-10-CM

## 2019-06-28 NOTE — PROGRESS NOTES
Genetic testing collected and order placed per Dr. Umair Willson using the [employer] portal. [facility] specimen confirmation #[id number] received. Called [facility] for package pickup. [employer] confirmation [id number] received.

## 2019-06-28 NOTE — PROGRESS NOTES
Ms. Emilie Larson is a 30year old woman with a history of right breast fibroadenoma, s/p core biopsy 7/20/18. She initially noted a mass around June 2018. Biopsy was benign. She reports it has increased in size and is causing more pain. She is interested in excision. Her paternal grandmother and two maternal aunts had breast cancer prior to the age of 48. Her maternal grandmother and grandfather had colon cancer. A paternal grandparent also had colon cancer. She does not think anyone has had genetic testing. Breast/GYN history:    OB History    None          Past Medical History:   Diagnosis Date    Anxiety     Depression     Eating disorder        Past Surgical History:   Procedure Laterality Date    HX ANKLE FRACTURE TX Left     HX BREAST BIOPSY Right 07/20/2018    Ultrasound Biopsy    HX PELVIC LAPAROSCOPY      HX WISDOM TEETH EXTRACTION         Current Outpatient Medications on File Prior to Visit   Medication Sig Dispense Refill    busPIRone (BUSPAR) 7.5 mg tablet Take 2 Tabs by mouth three (3) times daily. 180 Tab 1    levonorgestrel-ethinyl estradiol (LARISSIA) 0.1-20 mg-mcg tab Take  by mouth.  ALPRAZolam (XANAX) 1 mg tablet Take 1 Tab by mouth two (2) times daily as needed for Anxiety. Max Daily Amount: 2 mg. 60 Tab 2    ondansetron hcl (ZOFRAN) 8 mg tablet Take 1 Tab by mouth every eight (8) hours as needed for Nausea. 30 Tab 0     No current facility-administered medications on file prior to visit. Allergies   Allergen Reactions    Amoxicillin Rash       Social History     Tobacco Use    Smoking status: Former Smoker     Types: Cigarettes    Smokeless tobacco: Never Used   Substance Use Topics    Alcohol use:  Yes     Alcohol/week: 1.2 oz     Types: 2 Shots of liquor per week    Drug use: No       Family History   Problem Relation Age of Onset    Cancer Maternal Grandmother 61        colon    Cancer Maternal Grandfather 61        colon    Heart Disease Maternal Grandfather     Cancer Paternal Grandmother         breast cancer    Breast Cancer Paternal Grandmother         aged 46s    Colon Cancer Paternal Grandmother     Cancer Paternal Grandfather     Breast Cancer Paternal Aunt     Breast Cancer Paternal Aunt     Breast Cancer Paternal Aunt     Breast Cancer Paternal Aunt     Depression Mother     Anxiety Mother     Hypertension Father          ROS: positives are bolded  General: fevers, chills, night sweats, fatigue, weight loss, weight gain  GI: nausea, vomiting, abdominal pain, change in bowel habits, hematochezia, melena, GERD  Integ: dermatitis, abnormal moles  HEENT: visual changes, vertigo, epistaxis, dysphagia, hoarseness  Cardiac: chest pain, palpitations, HTN, edema, varicosities  Resp: cough, shortness of breath, wheezing, hemoptysis, snoring, reactive airway disease  : hematuria, dysuria, frequency, urgency, nocturia, stress urinary incontinence   MSK: weakness, joint pain, arthritis  Endocrine:  thyroid disease, polyuria, polydipsia, polyphagia, poor wound healing, heat intolerance, cold intolerance  Lymph/Heme: anemia, bruising, history of blood transfusions  Neuro: dizziness, headache, fainting, seizures, stroke  Psych: anxiety, depression    Physical Exam:  Visit Vitals  /69 (BP 1 Location: Right arm, BP Patient Position: Sitting)   Pulse 80   Temp 97.6 °F (36.4 °C) (Oral)   Resp 18   Ht 5' 3\" (1.6 m)   Wt 68.9 kg (152 lb)   SpO2 99%   BMI 26.93 kg/m²       Gen:  No distress  Head: normocephalic, atraumatic  Mouth: Clear, no overt lesions, oral mucosa pink and moist  Neck: supple, no masses, no adenopathy, trachea midline  Resp: clear bilaterally  Cardio: Regular rate and rhythm  Abdomen: soft, nontender, nondistended  Extremeties: warm, well-perfused  Neuro: sensation and strength grossly intact and symmetrical  Psych: alert and oriented to person, place and time  Breasts:   Right: Examined in both the supine and upright positions. There was no supraclavicular, infraclavicular, or axillary lympadenopathy. There were no skin changes identified visible on inspection and noted on palpation upper inner right breast approx 2.7cm solid smooth mobile mass consistent with previously biopsied fibroadenoma, dense breast tissue bilaterally with additional tender nodularity upper outer with no distinct mass  Left: Examined in both the supine and upright positions. There was no supraclavicular, infraclavicular, or axillary lympadenopathy. There were no dominant masses, no skin changes, no asymmetry identified dense breast tissue bilaterally      Imagin/3/18 right ultrasound  1. Solid right breast mass at the 3:00 position, 6 cm from the nipple. Consider  ultrasound-guided core needle biopsy for histopathologic correlation. The  patient requests biopsy. 2. The findings are recommendations were discussed the patient at time of exam.  3. The findings are recommendations were discussed with Dr. Angela Nieves office  staff, Vj Simms, at [phone] on 2018. The patient is scheduled for a an  ultrasound-guided right breast biopsy on 2018 at 0 8:30 AM, the  appointment time was per patient request.      A result letter will be sent to the patient.     Assessment Category 4: Suspicious       Pathology:  18   RIGHT BREAST MASS, 3 O'CLOCK, CORE BIOPSIES:   FIBROADENOMA. Impression:  Patient Active Problem List   Diagnosis Code    Anxiety F41.9    Depression F32.9    Weight gain R63.5    Tongue plaque K13.29    Recurrent depression ([facility] [address] F33.9    Tachycardia R00.0    Itching L29.9    Breast mass, right       30year old woman with a history of right breast fibroadenoma, s/p core biopsy 18. We discussed the pathology results of fibroadenoma in detail. We discussed that they are not malignant and do not have malignant potential. They may increase in size and they may cause pain.  Approximately 15% of women with a fibroadenoma will develop another one. I have asked her to notify the office if she notices another mass. If she experiences pain or increase in size, I generally recommend excision. Otherwise we will plan for conservative management with follow up in 3 months. As it is bothering her, Ms. Maycol Ni prefers excision. We additionally addressed genetic testing in detail. Ideally an individual with cancer is tested first as it can otherwise be difficult to interpret a negative result. If the family member with cancer has a known genetic mutation, other family members should consider testing. Usually it is best to start with first degree family members and extend from there, understanding sometimes first degree relatives are unable or unwilling to undergo testing. In the event the affected family member returns as negative for known mutation, there is no reason to proceed with genetic testing on unaffected family members. Ms. Morgan June has family history of cancer on both maternal and paternal sides with multiple  affected individuals making testing affected individuals first prohibitive. We discussed it is reasonable for her to undergo genetic testing at this time, understanding the limitations of a negative result. All questions were answered. She was asked to call with any additional questions or concerns.

## 2019-06-28 NOTE — PATIENT INSTRUCTIONS
If you have any questions or concerns about today's appointment, the verbal and/or written instructions you were given for follow up care, please call our office at [phone].     [facility] - [address], [address] [place] [address]    [phone] office  [phone]

## 2019-06-28 NOTE — PROGRESS NOTES
Patient presents as self referred for right breast lump. She has a strong family hx of breast cancer.

## 2019-06-28 NOTE — LETTER
6/28/2019 8:59 AM 
 
Patient:  Megan Cohen YOB: 1990 Date of Visit: 6/28/2019 Mallory Mills PA-C 
[address] [facility] [address] VIA In Basket MD [address]  
[address] [facility] [address] [address] VIA Facsimile: [phone] Dear GAIL Michelle MD, 
 
 
I had the pleasure of seeing Ms. Megan Cohen in my office today for her right breast mass. I am including a copy of my office visit today. If you have questions, please do not hesitate to call me. I look forward to following Ms. Casie Ho along with you and will keep you updated as to her progress. Sincerely, Kip Venegas MD

## 2019-07-05 ENCOUNTER — TELEPHONE (OUTPATIENT)
Dept: SURGERY | Age: 29
End: 2019-07-05

## 2019-07-05 NOTE — TELEPHONE ENCOUNTER
Called Manuel Gauthier to notify her that results of genetic testing from [facility] indicated a negative results. 2 identifiers utilized. Patient verbalized understanding.

## 2019-07-10 ENCOUNTER — OFFICE VISIT (OUTPATIENT)
Dept: INTERNAL MEDICINE CLINIC | Age: 29
End: 2019-07-10

## 2019-07-10 VITALS
HEIGHT: 63 IN | OXYGEN SATURATION: 99 % | HEART RATE: 91 BPM | RESPIRATION RATE: 16 BRPM | DIASTOLIC BLOOD PRESSURE: 86 MMHG | BODY MASS INDEX: 27.29 KG/M2 | WEIGHT: 154 LBS | SYSTOLIC BLOOD PRESSURE: 138 MMHG | TEMPERATURE: 98.7 F

## 2019-07-10 DIAGNOSIS — M25.872 ANKLE IMPINGEMENT SYNDROME, LEFT: ICD-10-CM

## 2019-07-10 DIAGNOSIS — G89.29 CHRONIC PAIN OF LEFT ANKLE: Primary | ICD-10-CM

## 2019-07-10 DIAGNOSIS — M25.572 CHRONIC PAIN OF LEFT ANKLE: Primary | ICD-10-CM

## 2019-07-10 DIAGNOSIS — F41.9 ANXIETY: ICD-10-CM

## 2019-07-10 RX ORDER — DIAZEPAM 5 MG/1
5 TABLET ORAL
Qty: 60 TAB | Refills: 2 | Status: SHIPPED | OUTPATIENT
Start: 2019-07-10 | End: 2019-10-17 | Stop reason: ALTCHOICE

## 2019-07-10 NOTE — PROGRESS NOTES
Chief Complaint   Patient presents with    Ankle Pain     Left ankle, needs parking plackard. 1. Have you been to the ER, urgent care clinic since your last visit? Hospitalized since your last visit? No    2. Have you seen or consulted any other health care providers outside of the [address] [place] [address] since your last visit? Include any pap smears or colon screening.  Yes Where: Ortho

## 2019-07-20 NOTE — PROGRESS NOTES
HISTORY OF PRESENT ILLNESS  Dale Monte is a 30 y.o. female. HPI   Pt is here for a handicap placard for her left ankle pain. She has had mult surgeries on her ankle and was dx w impingement syn and they do not know what to do at this point. Her last surgery they used a cadaver tendon but it is too loose and not holding. She is in pain daily and is anxious over what is going to happen. She also may be moving soon depending on her boyfriends orders so there is a lot unknown and that makes her anxious as well. She denies feeling depressed despite everything    Allergies   Allergen Reactions    Amoxicillin Rash       Current Outpatient Medications   Medication Sig    diazePAM (VALIUM) 5 mg tablet Take 1 Tab by mouth every eight (8) hours as needed for Anxiety (spasm). Max Daily Amount: 15 mg.    busPIRone (BUSPAR) 7.5 mg tablet Take 2 Tabs by mouth three (3) times daily.  levonorgestrel-ethinyl estradiol (LARISSIA) 0.1-20 mg-mcg tab Take  by mouth.  ondansetron hcl (ZOFRAN) 8 mg tablet Take 1 Tab by mouth every eight (8) hours as needed for Nausea. No current facility-administered medications for this visit. Review of Systems   Constitutional: Negative. Negative for chills, fever and malaise/fatigue. HENT: Negative. Negative for congestion, ear pain, sore throat and tinnitus. Eyes: Negative. Negative for blurred vision, double vision and photophobia. Respiratory: Negative. Negative for cough, shortness of breath and wheezing. Cardiovascular: Negative. Negative for chest pain, palpitations and leg swelling. Gastrointestinal: Positive for nausea (from anxiety). Negative for abdominal pain, heartburn and vomiting. Genitourinary: Negative. Negative for dysuria, frequency, hematuria and urgency. Musculoskeletal: Positive for joint pain (left). Negative for back pain, myalgias and neck pain. Skin: Negative. Negative for itching and rash. Neurological: Negative.   Negative for dizziness, tingling, tremors and headaches. Psychiatric/Behavioral: Positive for depression (controlled on meds). Negative for hallucinations, memory loss, substance abuse and suicidal ideas. The patient is nervous/anxious and has insomnia. Visit Vitals  /86 (BP 1 Location: Left arm, BP Patient Position: Sitting)   Pulse 91   Temp 98.7 °F (37.1 °C) (Oral)   Resp 16   Ht 5' 3\" (1.6 m)   Wt 154 lb (69.9 kg)   SpO2 99%   BMI 27.28 kg/m²       Physical Exam   Constitutional: She is oriented to person, place, and time. She appears well-developed and well-nourished. No distress. Pt is overweight   Cardiovascular: Normal rate, regular rhythm and normal heart sounds. Pulmonary/Chest: Effort normal and breath sounds normal.   Musculoskeletal:        Left ankle: She exhibits decreased range of motion. Tenderness. Neurological: She is alert and oriented to person, place, and time. Skin: Skin is warm and dry. She is not diaphoretic. Psychiatric: Her speech is normal and behavior is normal. Judgment and thought content normal. Her mood appears anxious. Thought content is not paranoid. Cognition and memory are normal. She does not exhibit a depressed mood. She expresses no homicidal and no suicidal ideation. ASSESSMENT and PLAN    ICD-10-CM ICD-9-CM    1. Chronic pain of left ankle M25.572 719.47 diazePAM (VALIUM) 5 mg tablet    G89.29 338.29    2. Anxiety F41.9 300.00 diazePAM (VALIUM) 5 mg tablet   3. Ankle impingement syndrome, left M25.872 726.79      Follow-up and Dispositions    · Return in about 3 months (around 10/10/2019). Pt expressed understanding of visit summary and plans for any follow ups or referrals as well as any medications prescribed.

## 2019-07-20 NOTE — PATIENT INSTRUCTIONS

## 2019-08-14 ENCOUNTER — ANESTHESIA EVENT (OUTPATIENT)
Dept: SURGERY | Age: 29
End: 2019-08-14
Payer: COMMERCIAL

## 2019-08-14 NOTE — PERIOP NOTES
PAT - SURGICAL PRE-ADMISSION INSTRUCTIONS    NAME:  Matteo Dominguez                                                          TODAY'S DATE:  8/14/2019    SURGERY DATE:  8/15/2019                                  SURGERY ARRIVAL TIME:   1100    1. Do NOT eat or drink anything, including candy or gum, after MIDNIGHT on 8/14/19 , unless you have specific instructions from your Surgeon or Anesthesia Provider to do so. 2. No smoking on the day of surgery. 3. No alcohol 24 hours prior to the day of surgery. 4. No recreational drugs for one week prior to the day of surgery. 5. Leave all valuables, including money/purse, at home. 6. Remove all jewelry, nail polish, makeup (including mascara); no lotions, powders, deodorant, or perfume/cologne/after shave. 7. Glasses/Contact lenses and Dentures may be worn to the hospital.  They will be removed prior to surgery. 8. Call your doctor if symptoms of a cold or illness develop within 24 ours prior to surgery. 9. AN ADULT MUST DRIVE YOU HOME AFTER OUTPATIENT SURGERY. 10. If you are having an OUTPATIENT procedure, please make arrangements for a responsible adult to be with you for 24 hours after your surgery. 11. If you are admitted to the hospital, you will be assigned to a bed after surgery is complete. Normally a family member will not be able to see you until you are in your assigned bed. 15. Family is encouraged to accompany you to the hospital.  We ask visitors in the treatment area to be limited to ONE person at a time to ensure patient privacy. EXCEPTIONS WILL BE MADE AS NEEDED. 15. Children under 12 are discouraged from entering the treatment area and need to be supervised by an adult when in the waiting room. Special Instructions: Take these medications the morning of surgery with a sip of water:  BUSPAR    Patient Prep:    shower with anti-bacterial soap    These surgical instructions were reviewed with PATIENT during the PAT PHONE CALL.    Directions: On the morning of surgery, please go to the [facility]. Enter the building from the [facility] entrance, 1st floor (next to the Emergency Room entrance). Take the elevator to the 2nd floor. Sign in at the Registration Desk.     If you have any questions and/or concerns, please do not hesitate to call:  (Prior to the day of surgery)  hospitals unit:  [phone]  (Day of surgery)  [facility] unit:  [phone]

## 2019-08-15 ENCOUNTER — HOSPITAL ENCOUNTER (OUTPATIENT)
Age: 29
Setting detail: OUTPATIENT SURGERY
Discharge: HOME | End: 2019-08-15
Attending: SURGERY | Admitting: SURGERY
Payer: COMMERCIAL

## 2019-08-15 ENCOUNTER — ANESTHESIA (OUTPATIENT)
Dept: SURGERY | Age: 29
End: 2019-08-15
Payer: COMMERCIAL

## 2019-08-15 VITALS
HEIGHT: 64 IN | HEART RATE: 79 BPM | WEIGHT: 149.3 LBS | BODY MASS INDEX: 25.49 KG/M2 | OXYGEN SATURATION: 99 % | SYSTOLIC BLOOD PRESSURE: 102 MMHG | DIASTOLIC BLOOD PRESSURE: 60 MMHG | RESPIRATION RATE: 12 BRPM | TEMPERATURE: 98 F

## 2019-08-15 DIAGNOSIS — N63.10 BREAST MASS, RIGHT: Primary | ICD-10-CM

## 2019-08-15 LAB — HCG UR QL: NEGATIVE

## 2019-08-15 PROCEDURE — 77030012510 HC MSK AIRWY LMA TELE -B: Performed by: NURSE ANESTHETIST, CERTIFIED REGISTERED

## 2019-08-15 PROCEDURE — 74011250637 HC RX REV CODE- 250/637: Performed by: NURSE ANESTHETIST, CERTIFIED REGISTERED

## 2019-08-15 PROCEDURE — 74011000250 HC RX REV CODE- 250: Performed by: SURGERY

## 2019-08-15 PROCEDURE — 74011250637 HC RX REV CODE- 250/637

## 2019-08-15 PROCEDURE — 77030002933 HC SUT MCRYL J&J -A: Performed by: SURGERY

## 2019-08-15 PROCEDURE — 74011250637 HC RX REV CODE- 250/637: Performed by: ANESTHESIOLOGY

## 2019-08-15 PROCEDURE — 77030002996 HC SUT SLK J&J -A: Performed by: SURGERY

## 2019-08-15 PROCEDURE — 74011250636 HC RX REV CODE- 250/636: Performed by: SURGERY

## 2019-08-15 PROCEDURE — 74011250636 HC RX REV CODE- 250/636: Performed by: NURSE ANESTHETIST, CERTIFIED REGISTERED

## 2019-08-15 PROCEDURE — 77030037400 HC ADH TISS HI VISC EXOFIN CHMP -B: Performed by: SURGERY

## 2019-08-15 PROCEDURE — 74011250636 HC RX REV CODE- 250/636

## 2019-08-15 PROCEDURE — 77030031139 HC SUT VCRL2 J&J -A: Performed by: SURGERY

## 2019-08-15 PROCEDURE — 76210000006 HC OR PH I REC 0.5 TO 1 HR: Performed by: SURGERY

## 2019-08-15 PROCEDURE — 76060000032 HC ANESTHESIA 0.5 TO 1 HR: Performed by: SURGERY

## 2019-08-15 PROCEDURE — 88307 TISSUE EXAM BY PATHOLOGIST: CPT

## 2019-08-15 PROCEDURE — 74011000272 HC RX REV CODE- 272: Performed by: SURGERY

## 2019-08-15 PROCEDURE — 77030013079 HC BLNKT BAIR HGGR 3M -A: Performed by: NURSE ANESTHETIST, CERTIFIED REGISTERED

## 2019-08-15 PROCEDURE — 81025 URINE PREGNANCY TEST: CPT

## 2019-08-15 PROCEDURE — 76210000021 HC REC RM PH II 0.5 TO 1 HR: Performed by: SURGERY

## 2019-08-15 PROCEDURE — 77030039266 HC ADH SKN EXOFIN S2SG -A: Performed by: SURGERY

## 2019-08-15 PROCEDURE — 76010000138 HC OR TIME 0.5 TO 1 HR: Performed by: SURGERY

## 2019-08-15 RX ORDER — HYDROMORPHONE HYDROCHLORIDE 1 MG/ML
0.2 INJECTION, SOLUTION INTRAMUSCULAR; INTRAVENOUS; SUBCUTANEOUS AS NEEDED
Status: DISCONTINUED | OUTPATIENT
Start: 2019-08-15 | End: 2019-08-15 | Stop reason: HOSPADM

## 2019-08-15 RX ORDER — SCOLOPAMINE TRANSDERMAL SYSTEM 1 MG/1
1 PATCH, EXTENDED RELEASE TRANSDERMAL ONCE
Status: DISCONTINUED | OUTPATIENT
Start: 2019-08-15 | End: 2019-08-15 | Stop reason: HOSPADM

## 2019-08-15 RX ORDER — BISMUTH SUBSALICYLATE 262 MG
1 TABLET,CHEWABLE ORAL DAILY
COMMUNITY

## 2019-08-15 RX ORDER — OXYCODONE AND ACETAMINOPHEN 5; 325 MG/1; MG/1
1 TABLET ORAL AS NEEDED
Status: DISCONTINUED | OUTPATIENT
Start: 2019-08-15 | End: 2019-08-15

## 2019-08-15 RX ORDER — SODIUM CHLORIDE, SODIUM LACTATE, POTASSIUM CHLORIDE, CALCIUM CHLORIDE 600; 310; 30; 20 MG/100ML; MG/100ML; MG/100ML; MG/100ML
100 INJECTION, SOLUTION INTRAVENOUS CONTINUOUS
Status: DISCONTINUED | OUTPATIENT
Start: 2019-08-15 | End: 2019-08-15 | Stop reason: HOSPADM

## 2019-08-15 RX ORDER — FAMOTIDINE 20 MG/1
TABLET, FILM COATED ORAL
Status: COMPLETED
Start: 2019-08-15 | End: 2019-08-15

## 2019-08-15 RX ORDER — HYDROCODONE BITARTRATE AND ACETAMINOPHEN 5; 325 MG/1; MG/1
1 TABLET ORAL
Qty: 5 TAB | Refills: 0 | Status: SHIPPED | OUTPATIENT
Start: 2019-08-15 | End: 2019-08-18

## 2019-08-15 RX ORDER — KETOROLAC TROMETHAMINE 30 MG/ML
INJECTION, SOLUTION INTRAMUSCULAR; INTRAVENOUS AS NEEDED
Status: DISCONTINUED | OUTPATIENT
Start: 2019-08-15 | End: 2019-08-15 | Stop reason: HOSPADM

## 2019-08-15 RX ORDER — FENTANYL CITRATE 50 UG/ML
INJECTION, SOLUTION INTRAMUSCULAR; INTRAVENOUS AS NEEDED
Status: DISCONTINUED | OUTPATIENT
Start: 2019-08-15 | End: 2019-08-15 | Stop reason: HOSPADM

## 2019-08-15 RX ORDER — BUPIVACAINE HYDROCHLORIDE AND EPINEPHRINE 5; 5 MG/ML; UG/ML
INJECTION, SOLUTION EPIDURAL; INTRACAUDAL; PERINEURAL AS NEEDED
Status: DISCONTINUED | OUTPATIENT
Start: 2019-08-15 | End: 2019-08-15 | Stop reason: HOSPADM

## 2019-08-15 RX ORDER — INSULIN LISPRO 100 [IU]/ML
INJECTION, SOLUTION INTRAVENOUS; SUBCUTANEOUS ONCE
Status: DISCONTINUED | OUTPATIENT
Start: 2019-08-15 | End: 2019-08-15 | Stop reason: HOSPADM

## 2019-08-15 RX ORDER — DIPHENHYDRAMINE HYDROCHLORIDE 50 MG/ML
12.5 INJECTION, SOLUTION INTRAMUSCULAR; INTRAVENOUS
Status: DISCONTINUED | OUTPATIENT
Start: 2019-08-15 | End: 2019-08-15 | Stop reason: HOSPADM

## 2019-08-15 RX ORDER — MIDAZOLAM HYDROCHLORIDE 1 MG/ML
INJECTION, SOLUTION INTRAMUSCULAR; INTRAVENOUS AS NEEDED
Status: DISCONTINUED | OUTPATIENT
Start: 2019-08-15 | End: 2019-08-15 | Stop reason: HOSPADM

## 2019-08-15 RX ORDER — PROPOFOL 10 MG/ML
INJECTION, EMULSION INTRAVENOUS AS NEEDED
Status: DISCONTINUED | OUTPATIENT
Start: 2019-08-15 | End: 2019-08-15 | Stop reason: HOSPADM

## 2019-08-15 RX ORDER — DEXAMETHASONE SODIUM PHOSPHATE 4 MG/ML
INJECTION, SOLUTION INTRA-ARTICULAR; INTRALESIONAL; INTRAMUSCULAR; INTRAVENOUS; SOFT TISSUE AS NEEDED
Status: DISCONTINUED | OUTPATIENT
Start: 2019-08-15 | End: 2019-08-15 | Stop reason: HOSPADM

## 2019-08-15 RX ORDER — SODIUM CHLORIDE, SODIUM LACTATE, POTASSIUM CHLORIDE, CALCIUM CHLORIDE 600; 310; 30; 20 MG/100ML; MG/100ML; MG/100ML; MG/100ML
50 INJECTION, SOLUTION INTRAVENOUS CONTINUOUS
Status: DISCONTINUED | OUTPATIENT
Start: 2019-08-16 | End: 2019-08-15 | Stop reason: HOSPADM

## 2019-08-15 RX ORDER — HYDROMORPHONE HYDROCHLORIDE 1 MG/ML
0.5 INJECTION, SOLUTION INTRAMUSCULAR; INTRAVENOUS; SUBCUTANEOUS
Status: DISCONTINUED | OUTPATIENT
Start: 2019-08-15 | End: 2019-08-15 | Stop reason: HOSPADM

## 2019-08-15 RX ORDER — SODIUM CHLORIDE 0.9 % (FLUSH) 0.9 %
5-40 SYRINGE (ML) INJECTION EVERY 8 HOURS
Status: DISCONTINUED | OUTPATIENT
Start: 2019-08-15 | End: 2019-08-15 | Stop reason: HOSPADM

## 2019-08-15 RX ORDER — SODIUM CHLORIDE 0.9 % (FLUSH) 0.9 %
5-40 SYRINGE (ML) INJECTION AS NEEDED
Status: DISCONTINUED | OUTPATIENT
Start: 2019-08-15 | End: 2019-08-15 | Stop reason: HOSPADM

## 2019-08-15 RX ORDER — LIDOCAINE HYDROCHLORIDE 20 MG/ML
INJECTION, SOLUTION EPIDURAL; INFILTRATION; INTRACAUDAL; PERINEURAL AS NEEDED
Status: DISCONTINUED | OUTPATIENT
Start: 2019-08-15 | End: 2019-08-15 | Stop reason: HOSPADM

## 2019-08-15 RX ORDER — ONDANSETRON HYDROCHLORIDE 4 MG/2ML
INJECTION, SOLUTION INTRAMUSCULAR; INTRAVENOUS AS NEEDED
Status: DISCONTINUED | OUTPATIENT
Start: 2019-08-15 | End: 2019-08-15 | Stop reason: HOSPADM

## 2019-08-15 RX ORDER — HYDROCODONE BITARTRATE AND ACETAMINOPHEN 5; 325 MG/1; MG/1
1 TABLET ORAL
Status: COMPLETED | OUTPATIENT
Start: 2019-08-15 | End: 2019-08-15

## 2019-08-15 RX ORDER — CLINDAMYCIN PHOSPHATE 900 MG/50ML
900 INJECTION INTRAVENOUS ONCE
Status: COMPLETED | OUTPATIENT
Start: 2019-08-15 | End: 2019-08-15

## 2019-08-15 RX ORDER — MAGNESIUM SULFATE 100 %
4 CRYSTALS MISCELLANEOUS AS NEEDED
Status: DISCONTINUED | OUTPATIENT
Start: 2019-08-15 | End: 2019-08-15 | Stop reason: HOSPADM

## 2019-08-15 RX ORDER — FAMOTIDINE 20 MG/1
20 TABLET, FILM COATED ORAL ONCE
Status: DISCONTINUED | OUTPATIENT
Start: 2019-08-16 | End: 2019-08-15 | Stop reason: HOSPADM

## 2019-08-15 RX ADMIN — FENTANYL CITRATE 50 MCG: 50 INJECTION, SOLUTION INTRAMUSCULAR; INTRAVENOUS at 12:23

## 2019-08-15 RX ADMIN — FAMOTIDINE 20 MG: 20 TABLET ORAL at 12:04

## 2019-08-15 RX ADMIN — CLINDAMYCIN PHOSPHATE 900 MG: 900 INJECTION INTRAVENOUS at 12:32

## 2019-08-15 RX ADMIN — MIDAZOLAM HYDROCHLORIDE 2 MG: 1 INJECTION, SOLUTION INTRAMUSCULAR; INTRAVENOUS at 12:23

## 2019-08-15 RX ADMIN — ONDANSETRON HYDROCHLORIDE 4 MG: 4 INJECTION, SOLUTION INTRAMUSCULAR; INTRAVENOUS at 13:00

## 2019-08-15 RX ADMIN — DEXAMETHASONE SODIUM PHOSPHATE 4 MG: 4 INJECTION, SOLUTION INTRA-ARTICULAR; INTRALESIONAL; INTRAMUSCULAR; INTRAVENOUS; SOFT TISSUE at 12:28

## 2019-08-15 RX ADMIN — FENTANYL CITRATE 50 MCG: 50 INJECTION, SOLUTION INTRAMUSCULAR; INTRAVENOUS at 12:40

## 2019-08-15 RX ADMIN — KETOROLAC TROMETHAMINE 30 MG: 30 INJECTION, SOLUTION INTRAMUSCULAR; INTRAVENOUS at 13:00

## 2019-08-15 RX ADMIN — HYDROCODONE BITARTRATE AND ACETAMINOPHEN 1 TABLET: 5; 325 TABLET ORAL at 14:18

## 2019-08-15 RX ADMIN — HYDROMORPHONE HYDROCHLORIDE 0.2 MG: 1 INJECTION, SOLUTION INTRAMUSCULAR; INTRAVENOUS; SUBCUTANEOUS at 13:50

## 2019-08-15 RX ADMIN — SODIUM CHLORIDE, SODIUM LACTATE, POTASSIUM CHLORIDE, AND CALCIUM CHLORIDE 50 ML/HR: 600; 310; 30; 20 INJECTION, SOLUTION INTRAVENOUS at 12:05

## 2019-08-15 RX ADMIN — LIDOCAINE HYDROCHLORIDE 100 MG: 20 INJECTION, SOLUTION EPIDURAL; INFILTRATION; INTRACAUDAL; PERINEURAL at 12:27

## 2019-08-15 RX ADMIN — PROPOFOL 200 MG: 10 INJECTION, EMULSION INTRAVENOUS at 12:27

## 2019-08-15 NOTE — OP NOTES
Date of Procedure: 8/15/2019  Preoperative Diagnosis: Right Breast Mass  Postoperative Diagnosis: Right Breast Mass  Procedure(s):  Procedure(s):  Excisional Biopsy of right Breast Mass    Surgeon(s) and Role:      Raji Doran MD - Primary         Surgical Staff: Circ-1: Ed Velasquez  Scrub Tech-1: Rosalva MAHARAJ  Surg Asst-1: [place] Aretha      Event Time In     Event Time In Time Out   Incision Start 1242    Incision Close 1310      Event Time In Time Out   Patient In - Facility (Arrived) 1111    Patient In - Pre-op 1132    Anesthesia Start [phone] [id number]    Patient Ready for OR 1205    Surgeon Available     Patient Out - Pre-op 1222    Patient In - OR 1223    Surgeon In OR 1235    Incision Start 1242    Incision Close 1310    Surgeon out of OR 1253    Patient Out - OR     Anesthesia Stop     Patient In - Phase I     Notice to Anesthesia     Care Complete - Phase I     Patient Out - Phase I     Patient In - Phase II     Patient Tolerates Liquids     Patient Ready for Visitors     Patient Education Complete     Patient Voided     Care Complete - Phase II     Patient Out - Phase II     End of [address]     Patient In - Overflow     Patient Out - Overflow         Anesthesia: General  Anesthesia staff: Anesthesiologist: Erasmo Johns MD  CRNA: Saul Montero CRNA  Estimated Blood Loss: 1cc  Specimens:   ID Type Source Tests Collected by Time Destination   1 : RIGHT BREAST MASS LONG LATERAL SHORT SUPERIOR TAN ANTERIOR Preservative   Melvin Griffin MD 8/15/2019 1250 Pathology        Findings: smooth lobulated mass   Complications: none noted  Implants: * No implants in log *      The patient was identified in the preoperative holding area and the risks again were reviewed with the patient who understands and agrees. She understands the risk of bleeding, infection, damage to surrounding structures, hematoma/seroma formation, and the possibility of missing the lesion in question.  She also understands additional surgery may be indicated. She was also marked by me to confirm the site and the procedure. The patient was taken to the operating suite and placed supine on the table. Compression stockings were placed and anesthesia induced without complication. She was then prepped and draped in the usual sterile fashion and an appropriate time-out was performed to confirm the patient, the side, and the procedure. Local anesthetic was infiltrated. An incision was made at the medial periareolar position in the [facility] breast. We then dissected into the subcutaneous tissue of the breast towards the lesion of concern. We then used electrocautery to remove a core of breast tissue around the lesion to remove the mass in its entirety. There was excellent hemostasis and the specimen was marked for orientation on removal of the tissue. This was then handed off the field for pathologic analysis. The breast tissue was reapproximated with 2-0 vicryl. All sponge and instrument counts were reported to me as correct. We continued the closure with a 3-0 vicryl suture, followed by 4-0 monocryl suture. Dermabond was then applied. The family was updated after the procedure. The patient was taken to recovery in good condition.

## 2019-08-15 NOTE — H&P
No change from office visit 6/28 copied and pasted below. Site marked, questions answered. Patient requesting Spenser Koo for pain postoperatively. Allergies noted, confirmed patient has taken and tolerated this or similar medication in the past and this is her choice for narcotic pain medication. We discussed tylenol and ibuprofen may be sufficient, assuming she has not been told to avoid either medication (generally due to concerns for kidneys, stomach or liver). I have recommended taking narcotic pain medication sparingly and only if needed. If using the narcotic pain medication, I have recommended taking with food. We discussed this class of medication can constipate, so I have encouraged use of Miralax or Milk of Magnesia if constipation occurs. This class of medication can also be addicting. Again I recommend taking narcotic pain medication sparingly and only if needed. Ms. Michele Mayorga is a 30year old woman with a history of right breast fibroadenoma, s/p core biopsy 7/20/18. She initially noted a mass around June 2018. Biopsy was benign. She reports it has increased in size and is causing more pain. She is interested in excision. Her paternal grandmother and two maternal aunts had breast cancer prior to the age of 48. Her maternal grandmother and grandfather had colon cancer. A paternal grandparent also had colon cancer.  She does not think anyone has had genetic testing.            Breast/GYN history:    OB History    None                 Past Medical History:   Diagnosis Date    Anxiety      Depression      Eating disorder                 Past Surgical History:   Procedure Laterality Date    HX ANKLE FRACTURE TX Left      HX BREAST BIOPSY Right 07/20/2018     Ultrasound Biopsy    HX PELVIC LAPAROSCOPY        HX WISDOM TEETH EXTRACTION                    Current Outpatient Medications on File Prior to Visit   Medication Sig Dispense Refill    busPIRone (BUSPAR) 7.5 mg tablet Take 2 Tabs by mouth three (3) times daily. 180 Tab 1    levonorgestrel-ethinyl estradiol (LARISSIA) 0.1-20 mg-mcg tab Take  by mouth.        ALPRAZolam (XANAX) 1 mg tablet Take 1 Tab by mouth two (2) times daily as needed for Anxiety. Max Daily Amount: 2 mg. 60 Tab 2    ondansetron hcl (ZOFRAN) 8 mg tablet Take 1 Tab by mouth every eight (8) hours as needed for Nausea. 30 Tab 0      No current facility-administered medications on file prior to visit.               Allergies   Allergen Reactions    Amoxicillin Rash         Social History            Tobacco Use    Smoking status: Former Smoker       Types: Cigarettes    Smokeless tobacco: Never Used   Substance Use Topics    Alcohol use: Yes       Alcohol/week: 1.2 oz       Types: 2 Shots of liquor per week    Drug use:  No               Family History   Problem Relation Age of Onset    Cancer Maternal Grandmother 61         colon    Cancer Maternal Grandfather 61         colon    Heart Disease Maternal Grandfather      Cancer Paternal Grandmother           breast cancer    Breast Cancer Paternal Grandmother           aged 46s    Colon Cancer Paternal Grandmother      Cancer Paternal Grandfather      Breast Cancer Paternal Aunt      Breast Cancer Paternal Aunt      Breast Cancer Paternal Aunt      Breast Cancer Paternal Aunt      Depression Mother      Anxiety Mother      Hypertension Father              ROS: positives are bolded  General: fevers, chills, night sweats, fatigue, weight loss, weight gain  GI: nausea, vomiting, abdominal pain, change in bowel habits, hematochezia, melena, GERD  Integ: dermatitis, abnormal moles  HEENT: visual changes, vertigo, epistaxis, dysphagia, hoarseness  Cardiac: chest pain, palpitations, HTN, edema, varicosities  Resp: cough, shortness of breath, wheezing, hemoptysis, snoring, reactive airway disease  : hematuria, dysuria, frequency, urgency, nocturia, stress urinary incontinence   MSK: weakness, joint pain, arthritis  Endocrine:  thyroid disease, polyuria, polydipsia, polyphagia, poor wound healing, heat intolerance, cold intolerance  Lymph/Heme: anemia, bruising, history of blood transfusions  Neuro: dizziness, headache, fainting, seizures, stroke  Psych: anxiety, depression     Physical Exam:  Visit Vitals  /69 (BP 1 Location: Right arm, BP Patient Position: Sitting)   Pulse 80   Temp 97.6 °F (36.4 °C) (Oral)   Resp 18   Ht 5' 3\" (1.6 m)   Wt 68.9 kg (152 lb)   SpO2 99%   BMI 26.93 kg/m²         Gen:  No distress  Head: normocephalic, atraumatic  Mouth: Clear, no overt lesions, oral mucosa pink and moist  Neck: supple, no masses, no adenopathy, trachea midline  Resp: clear bilaterally  Cardio: Regular rate and rhythm  Abdomen: soft, nontender, nondistended  Extremeties: warm, well-perfused  Neuro: sensation and strength grossly intact and symmetrical  Psych: alert and oriented to person, place and time  Breasts:   Right: Examined in both the supine and upright positions. There was no supraclavicular, infraclavicular, or axillary lympadenopathy. There were no skin changes identified visible on inspection and noted on palpation upper inner right breast approx 2.7cm solid smooth mobile mass consistent with previously biopsied fibroadenoma, dense breast tissue bilaterally with additional tender nodularity upper outer with no distinct mass  Left: Examined in both the supine and upright positions. There was no supraclavicular, infraclavicular, or axillary lympadenopathy. There were no dominant masses, no skin changes, no asymmetry identified dense breast tissue bilaterally        Imagin/3/18 right ultrasound  1. Solid right breast mass at the 3:00 position, 6 cm from the nipple. Consider  ultrasound-guided core needle biopsy for histopathologic correlation. The  patient requests biopsy.   2. The findings are recommendations were discussed the patient at time of exam.  3. The findings are recommendations were discussed with Dr. Powell See office  staff, Errolchadwick Agarwal, at [phone] on 07/03/2018. The patient is scheduled for a an  ultrasound-guided right breast biopsy on 07/20/2018 at 0 8:30 AM, the  appointment time was per patient request.      A result letter will be sent to the patient.     Assessment Category 4: Suspicious        Pathology:  7/20/18   RIGHT BREAST MASS, 3 O'CLOCK, CORE BIOPSIES:   FIBROADENOMA.      Impression:       Patient Active Problem List   Diagnosis Code    Anxiety F41.9    Depression F32.9    Weight gain R63.5    Tongue plaque K13.29    Recurrent depression ([facility] [address] F33.9    Tachycardia R00.0    Itching L29.9    Breast mass, right       30year old woman with a history of right breast fibroadenoma, s/p core biopsy 7/20/18. We discussed the pathology results of fibroadenoma in detail. We discussed that they are not malignant and do not have malignant potential. They may increase in size and they may cause pain. Approximately 15% of women with a fibroadenoma will develop another one. I have asked her to notify the office if she notices another mass. If she experiences pain or increase in size, I generally recommend excision. Otherwise we will plan for conservative management with follow up in 3 months. As it is bothering her, Ms. Alejandro Mota prefers excision.      We additionally addressed genetic testing in detail. Ideally an individual with cancer is tested first as it can otherwise be difficult to interpret a negative result. If the family member with cancer has a known genetic mutation, other family members should consider testing. Usually it is best to start with first degree family members and extend from there, understanding sometimes first degree relatives are unable or unwilling to undergo testing. In the event the affected family member returns as negative for known mutation, there is no reason to proceed with genetic testing on unaffected family members.   Ms. Jaxon Blackwell has family history of cancer on both maternal and paternal sides with multiple  affected individuals making testing affected individuals first prohibitive. We discussed it is reasonable for her to undergo genetic testing at this time, understanding the limitations of a negative result.        All questions were answered.   She was asked to call with any additional questions or concerns.             Electronically signed by Sujey Mistry MD at [phone]

## 2019-08-15 NOTE — PERIOP NOTES
1316 Received pt. Connected pt to monitor. VSS. Assessment preformed. RN at bedside. Will continue to monitor. [id number] to waiting room, spoke to [facility] and the [facility] - pt mother - pt id number verified. Update given on pt status.

## 2019-08-15 NOTE — ANESTHESIA POSTPROCEDURE EVALUATION
Procedure(s):  Excisional Biopsy of right Breast Mass. general    Anesthesia Post Evaluation      Multimodal analgesia: multimodal analgesia used between 6 hours prior to anesthesia start to PACU discharge  Patient location during evaluation: bedside  Patient participation: complete - patient participated  Level of consciousness: awake  Pain management: adequate  Airway patency: patent  Anesthetic complications: no  Cardiovascular status: stable  Respiratory status: acceptable  Hydration status: acceptable  Post anesthesia nausea and vomiting:  controlled      Vitals Value Taken Time   /60 8/15/2019  2:02 PM   Temp 36.7 °C (98 °F) 8/15/2019  1:16 PM   Pulse 79 8/15/2019  2:04 PM   Resp 8 8/15/2019  2:04 PM   SpO2 100 % 8/15/2019  2:04 PM   Vitals shown include unvalidated device data.

## 2019-08-15 NOTE — PERIOP NOTES
Phase 2 Recovery Summary  Patient arrived to Phase 2 at 1407    Vitals:    08/15/19 1353 08/15/19 1357 08/15/19 1402 08/15/19 1408   BP: 113/65 116/68 115/60 102/60   Pulse: 82 83 88 79   Resp: 15 13 16 12   Temp:       SpO2: 96% 99% 98% 99%   Weight:       Height:           oriented to time, place, person and situation    Lines and Drains  Peripherally Inserted Central Catheter:      Wound  Wound Breast Right (Active)   Dressing Status Clean, dry, and intact 8/15/2019  2:08 PM   Dressing Type Sutures; Topical skin adhesive/glue 8/15/2019  2:08 PM   Incision Site Well Approximated Yes 8/15/2019  2:08 PM   Drainage Amount None 8/15/2019  2:05 PM   Number of days: 0      Pt c/o of pain 4 out of 10, Norco given in recovery room. Family brought to recovery room. Pt tolerated PO fluids. Patient ambulated from bed to chair with minimal assistance. IV removed and patient allowed to get dressed. Reviewed d/c instructions with patient and family. Family signed for d/c. Patient transported to vehicle via wheelchair.      Patient discharged to home with family    Priti Fritz RN

## 2019-08-15 NOTE — DISCHARGE INSTRUCTIONS
Patient Education        Open Breast Biopsy: What to Expect at Home  Your Recovery  An open breast biopsy is surgery to take a sample of breast tissue. A breast biopsy may be done to check a lump found during a breast exam. Or it may be done to check an area of concern found on a mammogram or ultrasound. The breast tissue will be sent to a lab, where a doctor will look at the tissue under a microscope to check for breast cancer. Your doctor may have some answers right away. But it can take up to 1 to 2 weeks to get the final results. Your doctor will discuss the results with you. For a few days after the surgery, you will probably feel tired and have some pain. The skin around the cut (incision) may feel firm, swollen, and tender. The area may be bruised. Tenderness usually goes away in a few days, and the bruising within 2 weeks. Firmness and swelling may take 3 to 6 months to go away. The stitches in your incision may dissolve on their own, or the doctor may take them out 7 to 10 days after surgery. This care sheet gives you a general idea about how long it will take for you to recover. But each person recovers at a different pace. Follow the steps below to get better as quickly as possible. How can you care for yourself at home? Activity    · Rest when you feel tired. Getting enough sleep will help you recover.     · Try to walk each day. Start by walking a little more than you did the day before. Bit by bit, increase the amount you walk. Walking boosts blood flow and helps prevent pneumonia and constipation.     · For 2 weeks, avoid strenuous activities that put pressure on your chest or that involve vigorous movement of your upper body and arm on the side of the biopsy. Examples of these might include strenuous housework, holding an active child, jogging, or aerobic exercise.     · For 2 weeks, avoid lifting anything that would make you strain.  This may include heavy grocery bags and milk containers, a heavy briefcase or backpack, cat litter or dog food bags, a vacuum , or a child.     · Ask your doctor when you can drive again.     · You will probably need to take 1 or 2 days off from work. This depends on the type of work you do and how you feel. Diet    · You can eat your normal diet. If your stomach is upset, try bland, low-fat foods like plain rice, broiled chicken, toast, and yogurt. Medicines    · Your doctor will tell you if and when you can restart your medicines. He or she will also give you instructions about taking any new medicines.     · If you take blood thinners, such as warfarin (Coumadin), clopidogrel (Plavix), or aspirin, be sure to talk to your doctor. He or she will tell you if and when to start taking those medicines again. Make sure that you understand exactly what your doctor wants you to do.     · Be safe with medicines. Take pain medicines exactly as directed. ? If the doctor gave you a prescription medicine for pain, take it as prescribed. ? If you are not taking a prescription pain medicine, ask your doctor if you can take an over-the-counter medicine.     · If you think your pain medicine is making you sick to your stomach:  ? Take your medicine after meals (unless your doctor has told you not to). ? Ask your doctor for a different pain medicine.     · If your doctor prescribed antibiotics, take them as directed. Do not stop taking them just because you feel better. You need to take the full course of antibiotics. Incision care    · If you have strips of tape on the incision, leave the tape on for a week or until it falls off.     · Wash the area daily with warm, soapy water, and pat it dry. Don't use hydrogen peroxide or alcohol, which can slow healing. You may cover the area with a gauze bandage if it weeps or rubs against clothing. Change the bandage every day.     · Keep the area clean and dry.    Other instructions    · For the first 3 days after surgery, wear a supportive bra all the time, even at night. Follow-up care is a key part of your treatment and safety. Be sure to make and go to all appointments, and call your doctor if you are having problems. It's also a good idea to know your test results and keep a list of the medicines you take. When should you call for help? Call 911 anytime you think you may need emergency care. For example, call if:    · You passed out (lost consciousness).     · You have chest pain, are short of breath, or cough up blood.    Call your doctor now or seek immediate medical care if:    · You are sick to your stomach or cannot drink fluids.     · You have pain that does not get better after you take pain medicine.     · You cannot pass stools or gas.     · You have signs of a blood clot in your leg (called a deep vein thrombosis), such as:  ? Pain in your calf, back of the knee, thigh, or groin. ? Redness or swelling in your leg.     · You have signs of infection, such as:  ? Increased pain, swelling, warmth, or redness. ? Red streaks leading from the incision. ? Pus draining from the incision. ? A fever.     · You have loose stitches, or your incision comes open.     · Bright red blood has soaked through the bandage over your incision.    Watch closely for changes in your health, and be sure to contact your doctor if:    · You have any problems.     · You have new or worse swelling or pain in your arm. Where can you learn more? Go to http://sarah beth-aylin.info/. Enter D130 in the search box to learn more about \"Open Breast Biopsy: What to Expect at Home. \"  Current as of: December 19, 2018  Content Version: 12.1  © 5032-6211 [employer]. Care instructions adapted under license by [employer] (which disclaims liability or warranty for this information).  If you have questions about a medical condition or this instruction, always ask your healthcare professional. [employer] disclaims any warranty or liability for your use of this information. DISCHARGE SUMMARY from Nurse    PATIENT INSTRUCTIONS:    After general anesthesia or intravenous sedation, for 24 hours or while taking prescription Narcotics:  · Limit your activities  · Do not drive and operate hazardous machinery  · Do not make important personal or business decisions  · Do  not drink alcoholic beverages  · If you have not urinated within 8 hours after discharge, please contact your surgeon on call. Report the following to your surgeon:  · Excessive pain, swelling, redness or odor of or around the surgical area  · Temperature over 100.5  · Nausea and vomiting lasting longer than 4 hours or if unable to take medications  · Any signs of decreased circulation or nerve impairment to extremity: change in color, persistent  numbness, tingling, coldness or increase pain  · Any questions      These are general instructions for a healthy lifestyle:    No smoking/ No tobacco products/ Avoid exposure to second hand smoke  Surgeon General's Warning:  Quitting smoking now greatly reduces serious risk to your health. Obesity, smoking, and sedentary lifestyle greatly increases your risk for illness    A healthy diet, regular physical exercise & weight monitoring are important for maintaining a healthy lifestyle    You may be retaining fluid if you have a history of heart failure or if you experience any of the following symptoms:  Weight gain of 3 pounds or more overnight or 5 pounds in a week, increased swelling in our hands or feet or shortness of breath while lying flat in bed. Please call your doctor as soon as you notice any of these symptoms; do not wait until your next office visit. Patient armband removed and given to patient to take home. Patient was informed of the privacy risks if armband lost or stolen      The discharge information has been reviewed with the patient.   The patient verbalized understanding. Discharge medications reviewed with the patient and appropriate educational materials and side effects teaching were provided.   ___________________________________________________________________________________________________________________________________

## 2019-08-15 NOTE — PERIOP NOTES
Pre-Op Summary    Pt arrived via car with family/friend and is oriented to time, place, person and situation. Patient with steady gait with none assistive devices. Visit Vitals  /83   Pulse 86   Temp 99.5 °F (37.5 °C)   Resp 18   Ht 5' 4\" (1.626 m)   Wt 67.7 kg (149 lb 4.8 oz)   LMP 08/07/2019   SpO2 98%   BMI 25.63 kg/m²       Peripheral IV located on Left hand . Patients belongings are located with mom. Patient's point of contact is mom Nadja [facility]- [phone] , [phone] They will be leaving and coming back. They are able to receive medication information. They will be their ride home.

## 2019-08-15 NOTE — ANESTHESIA PREPROCEDURE EVALUATION
Relevant Problems   No relevant active problems       Anesthetic History     PONV          Review of Systems / Medical History  Patient summary reviewed and pertinent labs reviewed    Pulmonary          Smoker         Neuro/Psych         Psychiatric history     Cardiovascular                       GI/Hepatic/Renal                Endo/Other        Morbid obesity     Other Findings              Physical Exam    Airway  Mallampati: II  TM Distance: 4 - 6 cm  Neck ROM: normal range of motion   Mouth opening: Normal     Cardiovascular    Rhythm: regular  Rate: normal         Dental    Dentition: Poor dentition     Pulmonary  Breath sounds clear to auscultation               Abdominal  GI exam deferred       Other Findings            Anesthetic Plan    ASA: 2  Anesthesia type: general          Induction: Intravenous  Anesthetic plan and risks discussed with: Patient

## 2019-08-20 ENCOUNTER — TELEPHONE (OUTPATIENT)
Dept: SURGERY | Age: 29
End: 2019-08-20

## 2019-08-20 NOTE — TELEPHONE ENCOUNTER
Called patient per Dr. Jordan Whyte instructions to notify her that there was no evidence of  cancer. Dr. Avelino Perez will discuss the results in detail at her follow up appointment. Patient verbalized understanding.     ----- Message from Hugo Garcia MD sent at 8/20/2019  7:58 AM EDT -----  Please let her know there was no evidence of  cancer. We will discuss the results in detail at her follow up appointment.   Thank you  ----- Message -----  From: [facility] Results  Sent: 8/19/2019   3:37 PM EDT  To: Hugo Garcia MD

## 2019-08-22 ENCOUNTER — OFFICE VISIT (OUTPATIENT)
Dept: SURGERY | Age: 29
End: 2019-08-22

## 2019-08-22 VITALS
BODY MASS INDEX: 25.44 KG/M2 | DIASTOLIC BLOOD PRESSURE: 57 MMHG | OXYGEN SATURATION: 99 % | SYSTOLIC BLOOD PRESSURE: 108 MMHG | TEMPERATURE: 97.8 F | WEIGHT: 149 LBS | HEART RATE: 82 BPM | HEIGHT: 64 IN | RESPIRATION RATE: 18 BRPM

## 2019-08-22 DIAGNOSIS — N63.10 BREAST MASS, RIGHT: Primary | ICD-10-CM

## 2019-08-22 NOTE — PROGRESS NOTES
1. Have you been to the ER, urgent care clinic since your last visit? Hospitalized since your last visit? No    2. Have you seen or consulted any other health care providers outside of the [address] [place] [address] since your last visit? Include any pap smears or colon screening. No     Patient presents for post op from right breast excision of mass 8/15/19.

## 2019-08-22 NOTE — PATIENT INSTRUCTIONS
If you have any questions or concerns about today's appointment, the verbal and/or written instructions you were given for follow up care, please call our office at [phone].     [facility] - [address]    [phone] office  [phone]

## 2019-08-22 NOTE — LETTER
8/22/2019 9:01 AM 
 
Patient:  Naresh Fleming YOB: 1990 Date of Visit: 8/22/2019 Corinna Phalen, PA-C [facility] [place] [address] [address] [address] VIA In Basket Dear Corinna Phalen, PA-C, 
 
 
I had the pleasure of seeing Ms. Katherine Manzanares in my office today after excision of right breast fibroadenoma. I am including a copy of my office visit today. If you have questions, please do not hesitate to call me. I look forward to following Ms. Hans Obando along with you and will keep you updated as to her progress. Sincerely, Ramses Peña MD

## 2019-08-22 NOTE — PROGRESS NOTES
Ms. Summer Zuleta is a 30year old woman with a history of right breast fibroadenoma, s/p core biopsy 7/20/18 and excision 8/15/19. She initially noted a mass around June 2018. Biopsy was benign. She reports it has increased in size and is causing more pain. She is interested in excision. Her paternal grandmother and two maternal aunts had breast cancer prior to the age of 48. Her maternal grandmother and grandfather had colon cancer. A paternal grandparent also had colon cancer. She does not think anyone has had genetic testing. Breast/GYN history:    OB History    None          Past Medical History:   Diagnosis Date    Anxiety     Depression     Depression     Eating disorder     Nausea & vomiting        Past Surgical History:   Procedure Laterality Date    HX ANKLE FRACTURE TX Left     HX BREAST BIOPSY Right 07/20/2018    Ultrasound Biopsy    HX BREAST BIOPSY Right 8/15/2019    Excisional Biopsy of right Breast Mass performed by Katie [facility] at [facility] MA[facility] OR    HX PELVIC LAPAROSCOPY      HX WISDOM TEETH EXTRACTION         Current Outpatient Medications on File Prior to Visit   Medication Sig Dispense Refill    multivitamin (ONE A DAY) tablet Take 1 Tab by mouth daily.  diazePAM (VALIUM) 5 mg tablet Take 1 Tab by mouth every eight (8) hours as needed for Anxiety (spasm). Max Daily Amount: 15 mg. 60 Tab 2    busPIRone (BUSPAR) 7.5 mg tablet Take 2 Tabs by mouth three (3) times daily. 180 Tab 1    levonorgestrel-ethinyl estradiol (LARISSIA) 0.1-20 mg-mcg tab Take  by mouth.  ondansetron hcl (ZOFRAN) 8 mg tablet Take 1 Tab by mouth every eight (8) hours as needed for Nausea. 30 Tab 0     No current facility-administered medications on file prior to visit.         Allergies   Allergen Reactions    Amoxicillin Rash       Social History     Tobacco Use    Smoking status: Former Smoker     Types: Cigarettes    Smokeless tobacco: Never Used   Substance Use Topics    Alcohol use:  Yes     Alcohol/week: 2.0 standard drinks     Types: 2 Shots of liquor per week    Drug use: No       Family History   Problem Relation Age of Onset    Cancer Maternal Grandmother 61        colon    Cancer Maternal Grandfather 61        colon    Heart Disease Maternal Grandfather     Cancer Paternal Grandmother         breast cancer    Breast Cancer Paternal Grandmother         aged 46s    Colon Cancer Paternal Grandmother     Cancer Paternal Grandfather     Breast Cancer Paternal Aunt     Breast Cancer Paternal Aunt     Breast Cancer Paternal Aunt     Breast Cancer Paternal Aunt     Depression Mother     Anxiety Mother     Hypertension Father          ROS: positives are bolded  General: fevers, chills, night sweats, fatigue, weight loss, weight gain  GI: nausea, vomiting, abdominal pain, change in bowel habits, hematochezia, melena, GERD  Integ: dermatitis, abnormal moles  HEENT: visual changes, vertigo, epistaxis, dysphagia, hoarseness  Cardiac: chest pain, palpitations, HTN, edema, varicosities  Resp: cough, shortness of breath, wheezing, hemoptysis, snoring, reactive airway disease  : hematuria, dysuria, frequency, urgency, nocturia, stress urinary incontinence   MSK: weakness, joint pain, arthritis  Endocrine:  thyroid disease, polyuria, polydipsia, polyphagia, poor wound healing, heat intolerance, cold intolerance  Lymph/Heme: anemia, bruising, history of blood transfusions  Neuro: dizziness, headache, fainting, seizures, stroke  Psych: anxiety, depression    Physical Exam:  Visit Vitals  /57 (BP 1 Location: Right arm, BP Patient Position: Sitting)   Pulse 82   Temp 97.8 °F (36.6 °C) (Oral)   Resp 18   Ht 5' 4\" (1.626 m)   Wt 67.6 kg (149 lb)   LMP 08/07/2019   SpO2 99%   BMI 25.58 kg/m²       Gen:  No distress  Head: normocephalic, atraumatic  Mouth: Clear, no overt lesions, oral mucosa pink and moist  Neck: supple, no masses, no adenopathy, trachea midline  Resp: clear bilaterally  Cardio: Regular rate and rhythm  Abdomen: soft, nontender, nondistended  Extremeties: warm, well-perfused  Neuro: sensation and strength grossly intact and symmetrical  Psych: alert and oriented to person, place and time  Breasts:   Right: Examined in both the supine and upright positions. There was no supraclavicular, infraclavicular, or axillary lympadenopathy. There were no skin changes identified incision clean, hematoma/ecchymosis  Left: Examined in both the supine and upright positions. There was no supraclavicular, infraclavicular, or axillary lympadenopathy. There were no dominant masses, no skin changes, no asymmetry identified dense breast tissue bilaterally      Imagin/3/18 right ultrasound  1. Solid right breast mass at the 3:00 position, 6 cm from the nipple. Consider  ultrasound-guided core needle biopsy for histopathologic correlation. The  patient requests biopsy. 2. The findings are recommendations were discussed the patient at time of exam.  3. The findings are recommendations were discussed with Dr. Armando Freeman office  staff, [facility] [employer], at [phone] on 2018. The patient is scheduled for a an  ultrasound-guided right breast biopsy on 2018 at 0 8:30 AM, the  appointment time was per patient request.      A result letter will be sent to the patient.     Assessment Category 4: Suspicious       Pathology:  8/15/19   BREAST, RIGHT, PARTIAL MASTECTOMY:   FIBROADENOMA; 3.0 CM.     18   RIGHT BREAST MASS, 3 O'CLOCK, CORE BIOPSIES:   FIBROADENOMA. Impression:  Patient Active Problem List   Diagnosis Code    Anxiety F41.9    Depression F32.9    Weight gain R63.5    Tongue plaque K13.29    Recurrent depression ([facility] [address] F33.9    Tachycardia R00.0    Itching L29.9    Breast mass, right      30year old woman with a history of right breast fibroadenoma, s/p core biopsy 18 and excision 8/15/19. The pathology was reviewed. No additional intervention needed.   All questions were answered. She was asked to call with any additional questions or concerns.

## 2019-08-29 ENCOUNTER — OFFICE VISIT (OUTPATIENT)
Dept: ORTHOPEDIC SURGERY | Age: 29
End: 2019-08-29

## 2019-08-29 VITALS
WEIGHT: 149 LBS | OXYGEN SATURATION: 99 % | SYSTOLIC BLOOD PRESSURE: 124 MMHG | BODY MASS INDEX: 25.44 KG/M2 | TEMPERATURE: 98.1 F | HEIGHT: 64 IN | DIASTOLIC BLOOD PRESSURE: 89 MMHG | HEART RATE: 86 BPM

## 2019-08-29 DIAGNOSIS — M65.9 SYNOVITIS OF LEFT ANKLE: Primary | ICD-10-CM

## 2019-08-29 DIAGNOSIS — M65.9 SYNOVITIS OF LEFT ANKLE: ICD-10-CM

## 2019-08-29 RX ORDER — ALPRAZOLAM 1 MG/1
TABLET ORAL
COMMUNITY
End: 2019-10-12 | Stop reason: SDUPTHER

## 2019-08-29 RX ORDER — ONDANSETRON HYDROCHLORIDE 8 MG/1
8 TABLET, FILM COATED ORAL
COMMUNITY
Start: 2017-06-23 | End: 2019-09-10 | Stop reason: SDUPTHER

## 2019-08-29 NOTE — PROGRESS NOTES
Patient here c/o left ankle pain. Patient has previously had 4 left ankle surgeries. Patient states that for one of the surgeries, she had 2 screws removed. Patient denies any fevers, shakes or chills. Patient states she has never had any aspirations of the ankle  Patient reports that Dr. Chelsey Mosley injected her ankle with cortisone a lot\"      Patient had MRI in May 2019. On exam bilat ankle instability    Patient presents today in a CAM boot.

## 2019-08-29 NOTE — PROGRESS NOTES
AMBULATORY PROGRESS NOTE      Patient: Sherrill Alfonso             MRN: [id number]     SSN: [id number] Body mass index is 25.58 kg/m². YOB: 1990     AGE: 30 y.o. EX: female    PCP: Pablito Simon PA-C       TREATMENT PLAN      So, the plan is to perform an aspiration of her ankle. I did perform an aspiration of her ankle of some yellow fluid, slightly cloudy with slight sediment on my viewing. This was sent for cultures with Gram stain and total cell count with differential for crystal analysis. Orders Placed This Encounter    DRAIN/INJECT INTERMEDIATE JOINT/BURSA    CULTURE, BODY FLUID W GRAM STAIN    CRYSTALS, SYNOVIAL FLUID    URIC ACID    CELL COUNT, SYNOVIAL    AMB POC CELL COUNT, BODY FLUID          IMPRESSION//  DIAGNOSIS AND TREATMENT PLAN      DIAGNOSES    1. Synovitis of left ankle        Orders Placed This Encounter    DRAIN/INJECT INTERMEDIATE JOINT/BURSA    CULTURE, BODY FLUID W GRAM STAIN     Standing Status:   Future     Standing Expiration Date:   2/29/2020     Order Specific Question:   Tube Number:     Answer:   1    CRYSTALS, SYNOVIAL FLUID     Standing Status:   Future     Standing Expiration Date:   9/29/2020     Order Specific Question:   Fluid type: Answer:   Aspirate [84]    URIC ACID     Standing Status:   Future     Standing Expiration Date:   9/29/2019    CELL COUNT, SYNOVIAL     Standing Status:   Future     Standing Expiration Date:   9/29/2019     Scheduling Instructions:      STAT CELL COUNT    AMB POC CELL COUNT, BODY FLUID     LEFT ANKLE FLUID    TOTAL CELL COUNT  DIFFERENTIAL CELL COUNT PLEASE LEFT ANKLE FLUID     Order Specific Question:   Fluid type: Answer:   Joint Fluid [161]    ALPRAZolam (XANAX) 1 mg tablet     Sig: Xanax 1 mg tablet   Take 1 tablet as needed by oral route.  ondansetron hcl (ZOFRAN) 8 mg tablet     Sig: Take 8 mg by mouth.            PLAN  HPI //  [address] [facility] IS A 29 y.o. female who presents to my outpatient office for evaluation of: The patient, Nathaniel Mosley, is a 81-year-old female who is here for assessment of her left ankle. She has had at least three surgeries on her left ankle. She had an initial anterolateral ligament reconstruction in the past prior to 2015. She had a diagnosis of a chronic, left lateral ankle instability and underwent a left ankle ligament reconstruction with gracilis allograft augmentation, excision of a low lying peroneal brevis muscle belly and repair of the superior peroneal retinacular sheath, and this was performed by Dr. Abdirahman Scott at [facility] on October 14, 2015. She had a third surgery on her ankle by Dr. Stevo Mota, and this surgery was done at [facility] on June 23, 2015, for which he did an open arthrotomy of her ankle, removal of painful sutures along the lateral aspect of her left ankle. She had a diagnosis of lateral ankle impingement. She underwent arthroscopic surgery to excise extensive hypertrophic synovium to the anterolateral portion of her left ankle and, as I said before, open excision of painful sutures along the lateral portion of her left ankle. No other chondral pathology was seen at the time of that surgery. She had an MRI of her left ankle on May 4, 2019, at [facility]. The findings reveal:     1. Evidence of prior lateral ligament disrecontruction. The anterior talofibular ligamentous graft fibers appear mildly thickened and somewhat lax but intact in appearance. (This is a nonstressed MRI). 2. Remaining lateral ligaments, as well as syndesmotic ligaments and superior ligament are intact. 3. There is considerable tibiotalar synovitis with expansion of the anterior and posterior and lateral gutters. 4. Edematous infiltration of the sinus tarsi, likely reactive in nature.       I did review the MRI reports from Dr. Stevo Mota from October 14, 2015, and the MRI report from the same surgeon dated June 23, 2017, as well as an MRI dated May 4, 2019. 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 Jl Rangel. She had surgery roughly 2007 while she lived in [place]. She had an ankle ligament reconstruction. She then subsequently had worsening pain and discomfort in this left ankle and instability and was seen at [facility] by Dr. Tiff Rae and has had three surgeries. She had a cadaveric ankle ligament reconstruction and then she had another surgery to remove sutures and to remove the bio interferon screws, is what she describes, having two screws removed, and she states she had a third surgery, but I did not see any documentation of her third surgery. But, in any event, she is clear in telling me that she has had four surgeries on this left ankle. She denies any fevers, shakes, chills, and night sweats but does admit to having some hyperlaxity of her arms, elbows, and her thumbs. She denies ever having any diagnosis of Lucrecia-Danlos syndrome. She did very well, she states after her ankle ligament cadaveric reconstructive surgery. She was able to participate in activities and stay as active as she wanted and then she started having pain. Pain along the anterior portion of this left ankle that began in either January 2018 and then because of worsening pain, she had a surgery to remove bio interferon screws times two, is what she describes and then had arthroscopic surgery on her ankle also. At this point, she denies any fevers, shakes, chills, and night sweats, but she reports having persistent swelling to the anterior portion of her ankle, persistent pain. She describes having had numerous cortisone injections in her ankle by Dr. Nicky Cosme postop status post her ankle ligament reconstruction.   She states she had at least three cortisone injections this year, 2019. Visit Vitals  /89   Pulse 86   Temp 98.1 °F (36.7 °C) (Oral)   Ht 5' 4\" (1.626 m)   Wt 149 lb (67.6 kg)   LMP 08/07/2019   SpO2 99%   BMI 25.58 kg/m²       Appearance: Alert, well appearing and pleasant patient who is in no distress, oriented to person, place/time, and who follows commands. This patient is accompanied in the examination room by her  self. no dementia  Psychiatric: Affect and mood are appropriate. Patient arrives to office via: with assistive device: LEFT TALL CAM BOOT  H EENT (2): Head normocephalic & atraumatic. Both pupils are round, non icteric sclera     Eye: EOM are intact and sclera are clear    Neck: ROM WNL       Hearings Intact, does not require hearing aid device  Respiratory: Breathing is unlabored without accessory chest muscle use  Cardiovascular/Peripheral Vascular: Normal Pulses to each  Foot    OBJECTIVE EXAMINATION     Visit Vitals  /89   Pulse 86   Temp 98.1 °F (36.7 °C) (Oral)   Ht 5' 4\" (1.626 m)   Wt 149 lb (67.6 kg)   LMP 08/07/2019   SpO2 99%   BMI 25.58 kg/m²       Appearance: Alert, well appearing and pleasant patient who is in no distress, oriented to person, place/time, and who follows commands. Psychiatric: Affect and mood are appropriate. Cardiovascular/Peripheral Vascular: Normal Pulses to each hand and foot  Musculoskeletal:  LOCATION:  Left FOOT/ANKLE  Integumentary: No rashes, skin patches, wounds, or abrasions to the right or left legs       Warm and normal color. No regions of expressible drainage. There is moderate anterolateral soft tissue swelling and anteromedial soft tissue swelling. There is no redness to the skin or erythema to the skin or increased temperature to the skin. She does walk in her CAM walker boot. She does have tenderness to the anterolateral portion of her ankle, where there is distinct swelling. The anterior syndesmotic ligament remains nontender.   She has tenderness to the medial gutter of her ankle. The peroneal tendons have some mild tenderness with some mild to moderate swelling. The fourth and fifth metatarsal basilar regions are nontender. The midfoot, Achilles, cuboid, each of these are distinctly nontender, as well as the proximal fibula remains nontender. Motor Strength/Tone Exam: Normal to the toes with respect to extension/flexion      Sensory Exam:   Intact Normal Sensation to ankle/foot. Stability Testing: Peroneal tendon instability :  Not tested or conducted due to tenderness   Instability of ankle LESS THAN, 1+ anterior drawer, Varus Instability   Not tested or conducted due to tendernessSubtalar instability  Not tested or conducted due to tenderness. ROM: Decreased ROM noted to ankle      Normal Hindfoot (Subtalar, CC, TN regions)        Normal Forefoot         Contractures: No Achilles or Gastrocnemius Contractures      Calf tenderness: Absent for calf or gastrocnemius muscle regions       Soft, supple, non tender, non taut lower extremity compartments       Alignment: Neutral Hindfoot  Wounds/Abrasions:   None present  Extremities:   No embolic phenomena to the toes or hands         No significant edema to the foot and or toes.         Lower extremities are warm and appear well perfused    DVT: No evidence of DVT seen on examination at this time     No calf swelling, no tenderness to calf muscles  Lymphatic:  No Evidence of Lymphedema  Vascular: Medial Border of Tibia Region: Edema is not present        Pulses: Dorsalis Pedis &  Posterior Tibial Pulses : Palpable yes        Varicosities Lower Limbs :  None    Neuro: Negative bilateral Straight leg raise (seated position)    See Musculoskeletal section for pertinent individual extremity examination    No abnormal hand/wrist, foot/ankle, or facial/neck tremors          CHART REVIEW       Past Medical History:   Diagnosis Date    Anxiety     Depression     Depression     Eating disorder     Nausea & vomiting      Current Outpatient Medications   Medication Sig    ALPRAZolam (XANAX) 1 mg tablet Xanax 1 mg tablet   Take 1 tablet as needed by oral route.  ondansetron hcl (ZOFRAN) 8 mg tablet Take 8 mg by mouth.  multivitamin (ONE A DAY) tablet Take 1 Tab by mouth daily.  diazePAM (VALIUM) 5 mg tablet Take 1 Tab by mouth every eight (8) hours as needed for Anxiety (spasm). Max Daily Amount: 15 mg.    busPIRone (BUSPAR) 7.5 mg tablet Take 2 Tabs by mouth three (3) times daily.  levonorgestrel-ethinyl estradiol (LARISSIA) 0.1-20 mg-mcg tab Take  by mouth.  ondansetron hcl (ZOFRAN) 8 mg tablet Take 1 Tab by mouth every eight (8) hours as needed for Nausea. No current facility-administered medications for this visit. Allergies   Allergen Reactions    Amoxicillin Rash     Past Surgical History:   Procedure Laterality Date    HX ANKLE FRACTURE TX Left     HX BREAST BIOPSY Right 07/20/2018    Ultrasound Biopsy    HX BREAST BIOPSY Right 8/15/2019    Excisional Biopsy of right Breast Mass performed by Marilyn [facility]ap [facility] at [facility] MAIN OR    HX PELVIC LAPAROSCOPY      HX WISDOM TEETH EXTRACTION       Social History     Occupational History    Not on file   Tobacco Use    Smoking status: Former Smoker     Types: Cigarettes    Smokeless tobacco: Never Used   Substance and Sexual Activity    Alcohol use:  Yes     Alcohol/week: 2.0 standard drinks     Types: 2 Shots of liquor per week    Drug use: No    Sexual activity: Yes     Birth control/protection: Pill     Family History   Problem Relation Age of Onset    Cancer Maternal Grandmother 61        colon    Cancer Maternal Grandfather 61        colon    Heart Disease Maternal Grandfather     Cancer Paternal Grandmother         breast cancer    Breast Cancer Paternal Grandmother         aged 46s    Colon Cancer Paternal Grandmother     Cancer Paternal Grandfather     Breast Cancer Paternal Aunt     Breast Cancer Paternal Aunt     Breast Cancer Paternal Aunt     Breast Cancer Paternal Aunt     Depression Mother     Anxiety Mother     Hypertension Father         REVIEW OF SYSTEMS : 8/29/2019  ALL BELOW ARE Negative except : SEE HPI     Constitutional: Negative for fever, chills and weight loss. Neg Weight Loss  Cardiovascular: Negative for chest pain, claudication and leg swelling. SOB, MADDOX   Gastrointestinal/Urological: Negative for pain, N/V/D/C, Blood in stool or urine,dysuria,  Hematuria, Incontinence  Musculoskeletal: see HPI. Neurological: Negative for dizziness and weakness, headaches,Visual Changes or   Confusion, or Seizures,   Psychiatric/Behavioral: Negative for depression, memory loss and substance abuse. Extremities:  Negative for hair changes, rash or skin lesion changes. Hematologic: Negative for Bleeding problems, bruising, pallor or swollen lymph nodes. Peripheral Vascular: No calf pain, vascular vein tenderness to calf pain              No calf throbbing, posterior knee throbbing pain     DIAGNOSTIC IMAGING        MRI Results (most recent):  Results from Hospital Encounter encounter on 05/04/19   MRI ANKLE LT WO CONT    Narrative EXAM: MRI OF THE LEFT ANKLE    CLINICAL INDICATIONS/HISTORY: Ankle instability, medial ankle pain. Evaluation  for posterior tibial tendon tear    COMPARISON: None     TECHNIQUE: Sagittal T1 and STIR; axial PD and T2 with fat saturation; coronal T2  with fat saturation and axial oblique PD images of the ankle were obtained.    _______________    FINDINGS:    ANKLE LIGAMENTS:  Anterior and posterior inferior tibiofibular ligaments and syndesmosis: Intact  Anterior and posterior talofibular and the calcaneofibular ligaments: No  evidence of prior ligamentous reconstruction involving the anterior talo fibular  ligament. The reconstruction portion of the ligament appears thickened, and  somewhat lax in appearance but appears intact.  Calcaneofibular and posterior  talofibular ligaments appear intact. Deltoid and spring ligaments: Superficial and deep components of the deltoid  appear within normal limits. ANKLE AND FOOT TENDONS:  Peroneus longus and brevis tendons: The peroneal tendons are normally located. Peroneal tendons appear intact as visualized. No significant tenosynovitis. Tibialis posterior tendon: Intact and normal in signal intensity. Flexor digitorum longus and flexor hallucis longus tendons: Intact and normal in  signal intensity. Extensor tendons: Intact and normal in signal intensity. Achilles tendon: Achilles tendon is normal in morphology and signal intensity. Kager's fat pad is unremarkable. No retrocalcaneal bursitis. OSSEOUS:  Ankle: Large ankle joint effusion/synovitis noted with intermediate signal  intensity material noted along the anterior, lateral, and posterior gutters. No  fracture or contusion. Intact talar dome and symmetric ankle mortise. No  degenerative osteoarthropathy of the ankle. Hindfoot: No fracture or contusion. No hindfoot coalition. No degenerative  osteoarthropathy of the subtalar joints. Midfoot: No fracture or contusion. No degenerative osteoarthropathy of the  intertarsal articulations. OTHER:  Sinus tarsi: Subtotal infiltration of the sinus tarsi is present. Plantar fascia: Intact, normal in caliber and signal intensity. Tarsal tunnel: Normal in appearance. Regional soft tissues: No abnormal soft tissue fluid collections. _______________      Impression IMPRESSION:      1. Evidence of prior lateral ligamentous reconstruction. The anterior  talofibular ligament graft fibers appear mildly thickened and somewhat lax but  intact in appearance. 2. Remaining lateral ankle ligaments, as well as syndesmotic ligament superior  intact. 3. Considerable tibiotalar synovitis with expansion of the anterior, posterior,  and lateral gutters. 4. Edematous infiltration of the sinus tarsi, likely reactive in nature. Please see above section of this report. I have personally reviewed the results of the above study. The interpretation of this study is my professional opinion. PROCEDURE       TRACEY PROCEDURE OUTPATIENT PROCEDURE    PROCEDURE:  Injection of the left ankle - left,        I, Alexy Mcintyre MD, have reviewed the History, Physical and updated the Allergic reactions for [address] performed immediately prior to start of procedure:       * Patient was identified by name Belinda Ruiz and date of birth (1990)  * Agreement on procedure being performed was verified  * Risks and Benefits explained to the patient: see below  * Procedure site verified and marked as necessary  * Patient was positioned for comfort  * Verbal Consent and Written Consent Verified by myself and my office staff. * Complications: None  *  All patient and/or family questions answered     The procedure was explained to the patient and possible adverse reactions were discussed. These risks included, but not limited to: bleeding, infection, septic arthritis, local skin irritation (skin discoloration, hyperpigmentation, hypopigmentation, thinning of the skin, development of a wound, skin necrosis), synovitis, subcutaneous fat pad atrophy, subcutaneous abscess, tendon rupture, transient hyperglycemia. Infection may occur requiring surgical debridement and hospitalization. All Diabetics instructed to check serum blood sugar levels 3 times a day (day of the injection) due to hyperglycemia induced from cortisone injections. If serum blood sugar level > 250 mg%, Jane Echevarria instructed to call PCP to determine if any additional measures are needed.       Time: 10:25 AM  Date of procedure: 8/29/2019  Procedure performed by: Alexy Mcintyre MD  Provider assisted by:  MA  Patient accompanied by: [id number]  How tolerated by patient: tolerated the procedure well with no complications  Comments: none    The   left ankle -   area was prepped and cleansed with: sterile fashion using a following solution:  ALCOHOL/BETADINE STERILE PREP. Aspiration was performed and the fluid retrieved had visual appearance of:cloudy and YELLOW COLORED, SLIGHT SEDIMENTATION SEEN, ,sent to the lab for analysis,       Post injection instructions were given to [address] [place] [address]: Remove the band aid in 3 hours, Wash site with clean soap, No further dressings there after. Call Zhanna Burns  [phone] if any: pain, redness, drainage, fever, or any concerns/questions that [address] [place] [address] may have regarding the injection. [address] [place] [address] was advised on the signs of infection and instructed to go to the ER if it is office after hours. [address] [place] [address] tolerated the injection quite well.     Zhanna Burns MD MD  10:25 AM      Zhanna Burns MD  8/29/2019  9:16 AM

## 2019-08-29 NOTE — PATIENT INSTRUCTIONS
Chronic Ankle Laxity: Exercises  Introduction  Here are some examples of exercises for you to try. The exercises may be suggested for a condition or for rehabilitation. Start each exercise slowly. Ease off the exercises if you start to have pain. You will be told when to start these exercises and which ones will work best for you. How to do the exercises  Resisted ankle inversion    1. Sit on the floor with your good leg crossed over your other leg. 2. Hold both ends of an exercise band and loop the band around the inside of your affected foot. Then press your other foot against the band. 3. Keeping your legs crossed, slowly push your affected foot against the band so that foot moves away from your other foot. Then slowly relax. 4. Repeat 8 to 12 times. Resisted ankle eversion    1. Sit on the floor with your legs straight. 2. Hold both ends of an exercise band and loop the band around the outside of your affected foot. Then press your other foot against the band. 3. Keeping your leg straight, slowly push your affected foot outward against the band and away from your other foot without letting your leg rotate. Then slowly relax. 4. Repeat 8 to 12 times. Resisted ankle dorsiflexion    1. Tie the ends of an exercise band together to form a loop. Attach one end of the loop to a secure object or shut a door on it to hold it in place. (Or you can have someone hold one end of the loop to provide resistance.)  2. While sitting on the floor or in a chair, loop the other end of the band over the top of your affected foot. 3. Keeping your knee and leg straight, slowly flex your foot to pull back on the exercise band, and then slowly relax. 4. Repeat 8 to 12 times. Single-leg balance    1. Stand on a flat surface with your arms stretched out to your sides like you are making the letter \"T. \" Then lift your good leg off the floor, bending it at the knee.  If you are not steady on your feet, use one hand to hold on to a chair, counter, or wall. 2. Standing on the leg with your affected ankle, keep that knee straight. Try to balance on that leg for up to 30 seconds. Then rest for up to 10 seconds. 3. Repeat 6 to 8 times. 4. When you can balance on your affected leg for 30 seconds with your eyes open, try to balance on it with your eyes closed. 5. When you can do this exercise with your eyes closed for 30 seconds and with ease and no pain, try standing on a pillow or piece of foam, and repeat steps 1 through 4. Follow-up care is a key part of your treatment and safety. Be sure to make and go to all appointments, and call your doctor if you are having problems. It's also a good idea to know your test results and keep a list of the medicines you take. Where can you learn more? Go to http://sarah beth-aylin.info/. Enter P277 in the search box to learn more about \"Chronic Ankle Laxity: Exercises. \"  Current as of: September 20, 2018  Content Version: 12.1  © 1140-8812 [employer]. Care instructions adapted under license by [employer] (which disclaims liability or warranty for this information). If you have questions about a medical condition or this instruction, always ask your healthcare professional. [address] any warranty or liability for your use of this information.         Also discussed a possible aspiration of the left ankle

## 2019-09-03 ENCOUNTER — OFFICE VISIT (OUTPATIENT)
Dept: ORTHOPEDIC SURGERY | Age: 29
End: 2019-09-03

## 2019-09-03 ENCOUNTER — HOSPITAL ENCOUNTER (OUTPATIENT)
Dept: LAB | Age: 29
Discharge: HOME | End: 2019-09-03

## 2019-09-03 VITALS
OXYGEN SATURATION: 100 % | RESPIRATION RATE: 16 BRPM | BODY MASS INDEX: 25.78 KG/M2 | WEIGHT: 151 LBS | HEIGHT: 64 IN | TEMPERATURE: 99.3 F | HEART RATE: 106 BPM | DIASTOLIC BLOOD PRESSURE: 81 MMHG | SYSTOLIC BLOOD PRESSURE: 129 MMHG

## 2019-09-03 DIAGNOSIS — M65.9 SYNOVITIS OF LEFT ANKLE: Primary | ICD-10-CM

## 2019-09-03 DIAGNOSIS — M65.9 SYNOVITIS OF LEFT ANKLE: ICD-10-CM

## 2019-09-03 LAB — SENTARA SPECIMEN COL,SENBCF: NORMAL

## 2019-09-03 PROCEDURE — 99001 SPECIMEN HANDLING PT-LAB: CPT

## 2019-09-03 RX ORDER — FAMOTIDINE 40 MG/1
40 TABLET, FILM COATED ORAL DAILY
Qty: 30 TAB | Refills: 0 | Status: SHIPPED | OUTPATIENT
Start: 2019-09-03 | End: 2019-09-25 | Stop reason: SDUPTHER

## 2019-09-03 RX ORDER — INDOMETHACIN 75 MG/1
75 CAPSULE, EXTENDED RELEASE ORAL DAILY
Qty: 30 CAP | Refills: 0 | Status: SHIPPED | OUTPATIENT
Start: 2019-09-03 | End: 2019-10-03

## 2019-09-03 NOTE — PROGRESS NOTES
AMBULATORY PROGRESS NOTE      Patient: Sherrill Alfonso             MRN: [id number]     SSN: [id number] Body mass index is 25.92 kg/m². YOB: 1990     AGE: 30 y.o. SEX: female    PCP: Pablito Simon PA-C     IMPRESSION/DIAGNOSIS AND TREATMENT PLAN     DIAGNOSES  1. Synovitis of left ankle        Orders Placed This Encounter    CBC WITH AUTOMATED DIFF    METABOLIC PANEL, COMPREHENSIVE    SED RATE (ESR)    C REACTIVE PROTEIN, QT    URIC ACID    PROTHROMBIN TIME + INR    indomethacin SR (INDOCIN SR) 75 mg SR capsule    famotidine (PEPCID) 40 mg tablet      Jane Melida Garcia understands her diagnoses and the proposed plan. The aspiration results showed uric acid crystals, intracellular and extracellular. The fluid was too turbid in order to get a cell count. The final cultures grew out a scant growth of yeast.  I await the identification of this, which should be available within a week. However, I am going to get some blood work on her as well. Her ankle today has mild swelling anterolaterally. It is not reddened or erythematous. She denies any having any known history of gout. I will get some blood work on her. She may require arthroscopic washout of her ankle, but I want to treat her gout first and see if that helps with the swelling she has in her ankle, and we will see what definitive cultures grow out on the fungal cultures. Nothing bacterial is grown out. She understands that she may require arthroscopic surgery on her ankle for washout and maybe a synovial biopsy. Plan:    1) Lab Work: CBC w/ diff, ESR, CRP, UA, CMP, PT INR.   2) Indocin SR 75 m PO every day; 30 tablets, 0 refills if UA levels are low. 3) Pepcid 40 m PO every day; 30 tablets, 0 refills. 4) If UA levels are high, take Allopurinol 100 m PO BID for 5 days, then start Indocin. 5) Continue activity modification as directed.       RTO - 2 weeks     HPI AND EXAMINATION [address] [place] [address] A 30 y.o. female who presents to my outpatient office for follow up of synovitis of the left ankle. At the last visit, I ordered lab work (UA, CBC w/ diff, cell count), performed an aspiration of the right ankle, ordered cultures of the aspirated fluid from the ankle, and instructed the patient to continue activity modification as directed. Since we saw her last, Ms. Pauly Ace reports that she is still experiencing pain and swelling in her left lateral ankle. She describes the pain as a \"needle\" sensation. She reports that she has had night sweats and chills. She adds that she had an episode where she was sweating so bad, her chest broke out in a heat rash. While she notes she has not had chills and night sweats every night, she has been experiencing these symptoms frequently and experienced these symptoms last night. She denies any previous episodes of experiencing swelling, redness, or warmth in her ankle or great toe. Of note, the patient is getting  on September 28th, but is not going on a honeymoon immediately after. Visit Vitals  /81   Pulse (!) 106   Temp 99.3 °F (37.4 °C) (Oral)   Resp 16   Ht 5' 4\" (1.626 m)   Wt 151 lb (68.5 kg)   SpO2 100%   BMI 25.92 kg/m²     Appearance: Alert, well appearing and pleasant patient who is in no distress, oriented to person, place/time, and who follows commands. Psychiatric: Affect and mood are appropriate. Cardiovascular/Peripheral Vascular: Normal Pulses to each hand and foot  Musculoskeletal:  LOCATION:  Left FOOT/ANKLE  Integumentary: No rashes, skin patches, wounds, or abrasions to the right or left legs                             Warm and normal color. No regions of expressible drainage.                              Moderate anterolateral and anteromedial swelling, no redness, no erythema, slightly warm, slightly improved since last visit  Tenderness: Mild tenderness to the anterolateral ankle    Mild tenderness to the medial gutter of the ankle. Mild tenderness to the peroneal tendons        Motor Strength/Tone Exam: Normal to the toes with respect to extension/flexion      Sensory Exam:   Intact Normal Sensation to ankle/foot. Stability Testing: Peroneal tendon instability :  Not tested or conducted due to tenderness              Instability of ankle LESS THAN, 1+ anterior drawer, Varus Instability   Not tested or conducted due to tendernessSubtalar instability  Not tested or conducted due to tenderness. ROM: Decreased ROM noted to ankle                 Normal Hindfoot (Subtalar, CC, TN regions)                   Normal Forefoot         Contractures: No Achilles or Gastrocnemius Contractures      Calf tenderness: Absent for calf or gastrocnemius muscle regions       Soft, supple, non tender, non taut lower extremity compartments       Alignment: Neutral Hindfoot  Wounds/Abrasions:   None present  Extremities:   No embolic phenomena to the toes or hands                          No significant edema to the foot and or toes.                           Lower extremities are warm and appear well perfused                          DVT: No evidence of DVT seen on examination at this time                                      No calf swelling, no tenderness to calf muscles  Lymphatic:  No Evidence of Lymphedema  Vascular: Medial Border of Tibia Region: Edema is not present                   Pulses: Dorsalis Pedis &  Posterior Tibial Pulses : Palpable yes                   Varicosities Lower Limbs :  None    Neuro: Negative bilateral Straight leg raise (seated position)               See Musculoskeletal section for pertinent individual extremity examination               No abnormal hand/wrist, foot/ankle, or facial/neck tremors     CHART REVIEW     Past Medical History:   Diagnosis Date    Anxiety     Depression     Depression     Eating disorder     Nausea & vomiting      Current Outpatient Medications   Medication Sig    indomethacin SR (INDOCIN SR) 75 mg SR capsule Take 1 Cap by mouth daily for 30 days.  famotidine (PEPCID) 40 mg tablet Take 1 Tab by mouth daily.  ALPRAZolam (XANAX) 1 mg tablet Xanax 1 mg tablet   Take 1 tablet as needed by oral route.  ondansetron hcl (ZOFRAN) 8 mg tablet Take 8 mg by mouth.  multivitamin (ONE A DAY) tablet Take 1 Tab by mouth daily.  diazePAM (VALIUM) 5 mg tablet Take 1 Tab by mouth every eight (8) hours as needed for Anxiety (spasm). Max Daily Amount: 15 mg.    busPIRone (BUSPAR) 7.5 mg tablet Take 2 Tabs by mouth three (3) times daily.  levonorgestrel-ethinyl estradiol (LARISSIA) 0.1-20 mg-mcg tab Take  by mouth.  ondansetron hcl (ZOFRAN) 8 mg tablet Take 1 Tab by mouth every eight (8) hours as needed for Nausea. No current facility-administered medications for this visit. Allergies   Allergen Reactions    Amoxicillin Rash     Past Surgical History:   Procedure Laterality Date    HX ANKLE FRACTURE TX Left     HX BREAST BIOPSY Right 07/20/2018    Ultrasound Biopsy    HX BREAST BIOPSY Right 8/15/2019    Excisional Biopsy of right Breast Mass performed [facility]    HX PELVIC LAPAROSCOPY      HX WISDOM TEETH EXTRACTION       Social History     Occupational History    Not on file   Tobacco Use    Smoking status: Former Smoker     Types: Cigarettes    Smokeless tobacco: Never Used   Substance and Sexual Activity    Alcohol use:  Yes     Alcohol/week: 2.0 standard drinks     Types: 2 Shots of liquor per week    Drug use: No    Sexual activity: Yes     Birth control/protection: Pill     Family History   Problem Relation Age of Onset    Cancer Maternal Grandmother 61        colon    Cancer Maternal Grandfather 61        colon    Heart Disease Maternal Grandfather     Cancer Paternal Grandmother         breast cancer    Breast Cancer Paternal Grandmother         aged 46s    Colon Cancer Paternal Grandmother     Cancer Paternal Grandfather     Breast Cancer Paternal Aunt     Breast Cancer Paternal Aunt     Breast Cancer Paternal Aunt     Breast Cancer Paternal Aunt     Depression Mother     Anxiety Mother     Hypertension Father         REVIEW OF SYSTEMS : 9/3/2019  ALL BELOW ARE Negative except : SEE HPI     Constitutional: Negative for fever, chills and weight loss. Neg Weight Loss  Cardiovascular: Negative for chest pain, claudication and leg swelling. SOB, MADDOX   Gastrointestinal/Urological: Negative for  pain, N/V/D/C, Blood in stool or urine,dysuria                         Hematuria, Incontinence, pelvic pain  Musculoskeletal: see HPI. Neurological: Negative for dizziness and weakness, headaches,Visual Changes             Confusion,  Or Seizures,   Psychiatric/Behavioral: Negative for depression, memory loss and substance abuse. Extremities:  Negative for hair changes, rash or skin lesion changes. Hematologic: Negative for Bleeding problems, bruising, pallor or swollen lymph nodes. Peripheral Vascular: No calf pain, vascular vein tenderness to calf pain              No calf throbbing, posterior knee throbbing pain     DIAGNOSTIC IMAGING     No notes on file    Please see above section of this report. I have personally reviewed the results of the above study. The interpretation of this study is my professional opinion. Written by Stalin Graves, as dictated by Dr. Paco Lagos. I, Dr. Paco Lagos, confirm that all documentation is accurate.

## 2019-09-03 NOTE — PATIENT INSTRUCTIONS
Gout: Care Instructions  Your Care Instructions    Gout is a form of arthritis caused by a buildup of uric acid crystals in a joint. It causes sudden attacks of pain, swelling, redness, and stiffness, usually in one joint, especially the big toe. Gout usually comes on without a cause. But it can be brought on by drinking alcohol (especially beer) or eating seafood and red meat. Taking certain medicines, such as diuretics or aspirin, also can bring on an attack of gout. Taking your medicines as prescribed and following up with your doctor regularly can help you avoid gout attacks in the future. Follow-up care is a key part of your treatment and safety. Be sure to make and go to all appointments, and call your doctor if you are having problems. It's also a good idea to know your test results and keep a list of the medicines you take. How can you care for yourself at home? · If the joint is swollen, put ice or a cold pack on the area for 10 to 20 minutes at a time. Put a thin cloth between the ice and your skin. · Prop up the sore limb on a pillow when you ice it or anytime you sit or lie down during the next 3 days. Try to keep it above the level of your heart. This will help reduce swelling. · Rest sore joints. Avoid activities that put weight or strain on the joints for a few days. Take short rest breaks from your regular activities during the day. · Take your medicines exactly as prescribed. Call your doctor if you think you are having a problem with your medicine. · Take pain medicines exactly as directed. ? If the doctor gave you a prescription medicine for pain, take it as prescribed. ? If you are not taking a prescription pain medicine, ask your doctor if you can take an over-the-counter medicine. · Eat less seafood and red meat. · Check with your doctor before drinking alcohol. · Losing weight, if you are overweight, may help reduce attacks of gout. But do not go on a [employer]. \" Losing a lot of weight in a short amount of time can cause a gout attack. When should you call for help? Call your doctor now or seek immediate medical care if:    · You have a fever.     · The joint is so painful you cannot use it.     · You have sudden, unexplained swelling, redness, warmth, or severe pain in one or more joints.    Watch closely for changes in your health, and be sure to contact your doctor if:    · You have joint pain.     · Your symptoms get worse or are not improving after 2 or 3 days. Where can you learn more? Go to http://sarah beth-aylin.info/. Enter D645 in the search box to learn more about \"Gout: Care Instructions. \"  Current as of: April 1, 2019  Content Version: 12.1  © 3922-1792 Healthwise, [employer]. Care instructions adapted under license by [employer] (which disclaims liability or warranty for this information). If you have questions about a medical condition or this instruction, always ask your healthcare professional. [place] [address] any warranty or liability for your use of this information.

## 2019-09-04 ENCOUNTER — DOCUMENTATION ONLY (OUTPATIENT)
Dept: ORTHOPEDIC SURGERY | Age: 29
End: 2019-09-04

## 2019-09-04 LAB
A-G RATIO,AGRAT: 1.6 RATIO (ref 1.1–2.6)
ABSOLUTE LYMPHOCYTE COUNT, 10803: 2.3 K/UL (ref 1–4.8)
ALBUMIN SERPL-MCNC: 4.4 G/DL (ref 3.5–5)
ALP SERPL-CCNC: 72 U/L (ref 25–115)
ALT SERPL-CCNC: 17 U/L (ref 5–40)
ANION GAP SERPL CALC-SCNC: 12 MMOL/L
AST SERPL W P-5'-P-CCNC: 20 U/L (ref 10–37)
BASOPHILS # BLD: 0 K/UL (ref 0–0.2)
BASOPHILS NFR BLD: 1 % (ref 0–2)
BILIRUB SERPL-MCNC: 0.3 MG/DL (ref 0.2–1.2)
BUN SERPL-MCNC: 8 MG/DL (ref 6–22)
C-REACTIVE PROTEIN, QT, 006627: 0.9 MG/DL (ref 0–0.5)
CALCIUM SERPL-MCNC: 9.9 MG/DL (ref 8.4–10.5)
CHLORIDE SERPL-SCNC: 104 MMOL/L (ref 98–110)
CO2 SERPL-SCNC: 24 MMOL/L (ref 20–32)
CREAT SERPL-MCNC: 0.8 MG/DL (ref 0.5–1.2)
EOSINOPHIL # BLD: 0.1 K/UL (ref 0–0.5)
EOSINOPHIL NFR BLD: 1 % (ref 0–6)
ERYTHROCYTE [DISTWIDTH] IN BLOOD BY AUTOMATED COUNT: 12.6 % (ref 10–15.5)
GFRAA, 66117: >60
GFRNA, 66118: >60
GLOBULIN,GLOB: 2.8 G/DL (ref 2–4)
GLUCOSE SERPL-MCNC: 90 MG/DL (ref 70–99)
GRANULOCYTES,GRANS: 63 % (ref 40–75)
HCT VFR BLD AUTO: 42.8 % (ref 35.1–46.5)
HGB BLD-MCNC: 14.1 G/DL (ref 11.7–15.5)
INR PPP: 0.95 (ref 0.89–1.29)
LYMPHOCYTES, LYMLT: 30 % (ref 20–45)
MCH RBC QN AUTO: 31 PG (ref 26–34)
MCHC RBC AUTO-ENTMCNC: 33 G/DL (ref 31–36)
MCV RBC AUTO: 95 FL (ref 80–95)
MONOCYTES # BLD: 0.4 K/UL (ref 0.1–1)
MONOCYTES NFR BLD: 6 % (ref 3–12)
NEUTROPHILS # BLD AUTO: 4.8 K/UL (ref 1.8–7.7)
PLATELET # BLD AUTO: 287 K/UL (ref 140–440)
PMV BLD AUTO: 11.7 FL (ref 9–13)
POTASSIUM SERPL-SCNC: 4.5 MMOL/L (ref 3.5–5.5)
PROT SERPL-MCNC: 7.2 G/DL (ref 6.4–8.3)
PROTHROMBIN TIME: 10.1 SEC (ref 9–13)
RBC # BLD AUTO: 4.49 M/UL (ref 3.8–5.2)
SED RATE (ESR): 17 MM/HR (ref 0–20)
SODIUM SERPL-SCNC: 140 MMOL/L (ref 133–145)
URATE SERPL-MCNC: 3.9 MG/DL (ref 2.2–7.7)
WBC # BLD AUTO: 7.6 K/UL (ref 4–11)

## 2019-09-04 NOTE — PROGRESS NOTES
Per Dr. Patrick Mejias, patient is to discontinue the Motrin and start the Indocin Sr.  Patient verbalized understanding.

## 2019-09-05 ENCOUNTER — TELEPHONE (OUTPATIENT)
Dept: ORTHOPEDIC SURGERY | Age: 29
End: 2019-09-05

## 2019-09-05 NOTE — TELEPHONE ENCOUNTER
Patient called and stated that when she takes indocin xr at night by morning she is in extreme pain during work hours, if she takes it in the morning she is in extreme pain during the night. Patient is wanting to know can she increase dosing to BID. Best contact number for patient [phone].

## 2019-09-06 NOTE — TELEPHONE ENCOUNTER
Patient cannot increase dosage of Indocin. She can add cherry juice and follow up in the office on Monday.

## 2019-09-09 ENCOUNTER — HOSPITAL ENCOUNTER (OUTPATIENT)
Dept: INFUSION THERAPY | Age: 29
Discharge: HOME | End: 2019-09-09
Payer: COMMERCIAL

## 2019-09-09 ENCOUNTER — TELEPHONE (OUTPATIENT)
Dept: INTERNAL MEDICINE CLINIC | Age: 29
End: 2019-09-09

## 2019-09-09 DIAGNOSIS — R11.0 NAUSEA: Primary | ICD-10-CM

## 2019-09-09 PROCEDURE — 74011250636 HC RX REV CODE- 250/636: Performed by: NURSE PRACTITIONER

## 2019-09-09 PROCEDURE — 96374 THER/PROPH/DIAG INJ IV PUSH: CPT

## 2019-09-09 RX ORDER — ONDANSETRON 2 MG/ML
4 INJECTION INTRAMUSCULAR; INTRAVENOUS ONCE
Status: COMPLETED | OUTPATIENT
Start: 2019-09-09 | End: 2019-09-09

## 2019-09-09 RX ADMIN — ONDANSETRON HYDROCHLORIDE 4 MG: 2 INJECTION, SOLUTION INTRAMUSCULAR; INTRAVENOUS at 14:38

## 2019-09-09 NOTE — PROGRESS NOTES
[facility] Progress Note    Date: 2019    Name: Sincere Rios    MRN: [id number]         : 1990     IV Zofran Infusion    Ms. Ronaldo Simms to [facility], ambulatory, at 1400. Pt was assessed and education was provided. Ms. Helio Rosa vitals were reviewed and patient was observed for 5 minutes prior to treatment. Visit Vitals  /69 (BP 1 Location: Left arm, BP Patient Position: Sitting)   Pulse 72   Temp 98 °F (36.7 °C)   Resp 18   SpO2 95%         22 g PIV placed in LAC x 1 attempt. PIV flushed easily and had brisk blood return. Zofran 4 mg was given IVP per order. Ms. Ronaldo Simms tolerated the infusion, and had no complaints. VS remained stable. PIV flushed with NS 10 ml and removed. No bleeding or hematoma noted at site. Guaze and coban applied. Patient armband removed and shredded. Ms. Ronaldo Simms was discharged from [place] [address] in stable condition at [phone]. She is to follow up with physician as needed.     Johanna Franklin RN  2019

## 2019-09-09 NOTE — TELEPHONE ENCOUNTER
Pt calling c/o n/v and diarrhea. Started taking Indocin last Thursday for gout in her ankle. Getting infusion due to nausea and vomiting. Per Timmy, stop Indocin. Pt states the med was helping the ankle pain. She can take Ibuprofen as directed on [facility] for pain. Timmy will send in Zofran to take for n/v. Pt should contact MD who started Indocin and expalin what is going on for alternative tx.     [facility] in [address] for [facility]an

## 2019-09-10 ENCOUNTER — OFFICE VISIT (OUTPATIENT)
Dept: ORTHOPEDIC SURGERY | Age: 29
End: 2019-09-10

## 2019-09-10 VITALS
HEART RATE: 98 BPM | DIASTOLIC BLOOD PRESSURE: 78 MMHG | HEIGHT: 64 IN | OXYGEN SATURATION: 100 % | SYSTOLIC BLOOD PRESSURE: 123 MMHG | WEIGHT: 151 LBS | RESPIRATION RATE: 16 BRPM | BODY MASS INDEX: 25.78 KG/M2 | TEMPERATURE: 97.8 F

## 2019-09-10 DIAGNOSIS — M65.9 SYNOVITIS OF LEFT ANKLE: Primary | ICD-10-CM

## 2019-09-10 RX ORDER — MELOXICAM 15 MG/1
15 TABLET ORAL
Qty: 30 TAB | Refills: 0 | Status: SHIPPED | OUTPATIENT
Start: 2019-09-10

## 2019-09-10 RX ORDER — FAMOTIDINE 40 MG/1
40 TABLET, FILM COATED ORAL DAILY
Qty: 30 TAB | Refills: 0 | Status: SHIPPED | OUTPATIENT
Start: 2019-09-10 | End: 2019-12-27 | Stop reason: SDUPTHER

## 2019-09-10 RX ORDER — ONDANSETRON 8 MG/1
8 TABLET, ORALLY DISINTEGRATING ORAL
Qty: 30 TAB | Refills: 1 | Status: SHIPPED | OUTPATIENT
Start: 2019-09-10

## 2019-09-10 NOTE — PROGRESS NOTES
1. Have you been to the ER, urgent care clinic since your last visit? Hospitalized since your last visit? No    2. Have you seen or consulted any other health care providers outside of the [address] [place] [address] since your last visit? Include any pap smears or colon screening.  No

## 2019-09-10 NOTE — PATIENT INSTRUCTIONS
Contact Marilynn Brar for surgical scheduling, [phone]      Leg and Ankle Edema: Care Instructions  Your Care Instructions  Swelling in the legs, ankles, and feet is called edema. It is common after you sit or stand for a while. Long plane flights or car rides often cause swelling in the legs and feet. You may also have swelling if you have to stand for long periods of time at your job. Problems with the veins in the legs (varicose veins) and changes in hormones can also cause swelling. Sometimes the swelling in the ankles and feet is caused by a more serious problem, such as heart failure, infection, blood clots, or liver or kidney disease. Follow-up care is a key part of your treatment and safety. Be sure to make and go to all appointments, and call your doctor if you are having problems. It's also a good idea to know your test results and keep a list of the medicines you take. How can you care for yourself at home? · If your doctor gave you medicine, take it as prescribed. Call your doctor if you think you are having a problem with your medicine. · Whenever you are resting, raise your legs up. Try to keep the swollen area higher than the level of your heart. · Take breaks from standing or sitting in one position. ? Walk around to increase the blood flow in your lower legs. ? Move your feet and ankles often while you stand, or tighten and relax your leg muscles. · Wear support stockings. Put them on in the morning, before swelling gets worse. · Eat a balanced diet. Lose weight if you need to. · Limit the amount of salt (sodium) in your diet. Salt holds fluid in the body and may increase swelling. When should you call for help? Call 911 anytime you think you may need emergency care. For example, call if:    · You have symptoms of a blood clot in your lung (called a pulmonary embolism). These may include:  ? Sudden chest pain. ? Trouble breathing. ?  Coughing up blood.    Call your doctor now or seek immediate medical care if:    · You have signs of a blood clot, such as:  ? Pain in your calf, back of the knee, thigh, or groin. ? Redness and swelling in your leg or groin.     · You have symptoms of infection, such as:  ? Increased pain, swelling, warmth, or redness. ? Red streaks or pus. ? A fever.    Watch closely for changes in your health, and be sure to contact your doctor if:    · Your swelling is getting worse.     · You have new or worsening pain in your legs.     · You do not get better as expected. Where can you learn more? Go to http://sarah beth-aylin.info/. Enter B218 in the search box to learn more about \"Leg and Ankle Edema: Care Instructions. \"  Current as of: September 23, 2018  Content Version: 12.1  © 8144-6839 Healthwise, [employer]. Care instructions adapted under license by [employer] (which disclaims liability or warranty for this information). If you have questions about a medical condition or this instruction, always ask your healthcare professional. [address] any warranty or liability for your use of this information.

## 2019-09-10 NOTE — PROGRESS NOTES
AMBULATORY PROGRESS NOTE      Patient: Naresh Fleming             MRN: [id number]     SSN: [id number] Body mass index is 25.92 kg/m². YOB: 1990     AGE: 30 y.o. SEX: female    PCP: Wilbur Sagastume PA-C     IMPRESSION/DIAGNOSIS AND TREATMENT PLAN     DIAGNOSES  1. Synovitis of left ankle        No orders of the defined types were placed in this encounter. Naresh Fleming understands her diagnoses and the proposed plan. So, my plan for her would be arthroscopic lavage, arthroscopic synovial biopsy of her left ankle. Surgical location:  [facility]. Time of surgery:  60 minutes. Outpatient surgery    Consultations:  None. We will stop the Indomethacin should this cause her GI discomfort. She took Celebrex in the past.  That did not help her. I anticipate the instrumentation would be Arthroscopic equipment, [employer] instrumentation. Plan:    1) Mobic 15 m PO every day; 30 tablets, 0 refills. 2) Pepcid 40 m PO every day; 30 tablets, 0 refills. 3) Contact Anh for surgical scheduling. 4) Continue activity modification as directed. RTO - 4 weeks /      HPI AND EXAMINATION     Jane Echevarria IS A 30 y.o. female who presents to my outpatient office for follow up of synovitis of the left ankle. At the last visit, I ordered lab work (CBC w/ diff, ESR, CRP, UA, CMP, PT INR), prescribed Indocin SR 75 mg for if UA levels are low, Pepcid 40 mg, and Allopurinol 100 mg for if UA levels are high, and instructed the patient to continue activity modification as directed. Since we saw her last, Ms. Hans Obando states that her swelling had gone down and that the Indocin was helping with her swelling and pain, but this medication bothered her stomach. As such, she had to stop taking this medication. She reports that she has tried Celebrex and that this medication did not help with her swelling.  She notes that she has been walking without her boot in the home. However, she recalls that she went to the store once without her boot and that going around the store was causing her discomfort and pain. She states that she would like to start planning for surgery. She will be getting  on September 28th and then is going on her honeymoon. She would like to have surgery after October 7th. She states that October 10th or October 17th would work for her. The patient smokes half a pack of cigarettes a day. Visit Vitals  /78   Pulse 98   Temp 97.8 °F (36.6 °C) (Oral)   Resp 16   Ht 5' 4\" (1.626 m)   Wt 151 lb (68.5 kg)   SpO2 100%   BMI 25.92 kg/m²     Appearance: Alert, well appearing and pleasant patient who is in no distress, oriented to person, place/time, and who follows commands. Psychiatric: Affect and mood are appropriate. Cardiovascular/Peripheral Vascular: Normal Pulses to each hand and foot  Musculoskeletal:  LOCATION:  Left FOOT/ANKLE  Integumentary: No rashes, skin patches, wounds, or abrasions to the right or left legs                             Warm and normal color. No regions of expressible drainage. Moderate anterolateral and anteromedial swelling, no redness, no erythema, slightly warm, slightly improved since last visit  Tenderness: Mild tenderness to the anterolateral ankle    Mild tenderness to the medial gutter of the ankle. Mild tenderness to the peroneal tendons        Motor Strength/Tone Exam: Normal to the toes with respect to extension/flexion      Sensory Exam:   Intact Normal Sensation to ankle/foot. Stability Testing: Peroneal tendon instability :  Not tested or conducted due to tenderness              Instability of ankle LESS THAN, 1+ anterior drawer, Varus Instability   Not tested or conducted due to tendernessSubtalar instability  Not tested or conducted due to tenderness.          ROM: Decreased ROM noted to ankle                 Normal Hindfoot (Subtalar, CC, TN regions)                   Normal Forefoot         Contractures: No Achilles or Gastrocnemius Contractures      Calf tenderness: Absent for calf or gastrocnemius muscle regions       Soft, supple, non tender, non taut lower extremity compartments       Alignment: Neutral Hindfoot  Wounds/Abrasions:   None present  Extremities:   No embolic phenomena to the toes or hands                          No significant edema to the foot and or toes. Lower extremities are warm and appear well perfused                          DVT: No evidence of DVT seen on examination at this time                                      No calf swelling, no tenderness to calf muscles  Lymphatic:  No Evidence of Lymphedema  Vascular: Medial Border of Tibia Region: Edema is not present                   Pulses: Dorsalis Pedis &  Posterior Tibial Pulses : Palpable yes                   Varicosities Lower Limbs :  None    Neuro: Negative bilateral Straight leg raise (seated position)               See Musculoskeletal section for pertinent individual extremity examination               No abnormal hand/wrist, foot/ankle, or facial/neck tremors     CHART REVIEW     Past Medical History:   Diagnosis Date    Anxiety     Depression     Depression     Eating disorder     Nausea & vomiting      Current Outpatient Medications   Medication Sig    famotidine (PEPCID) 40 mg tablet Take 1 Tab by mouth daily.  ALPRAZolam (XANAX) 1 mg tablet Xanax 1 mg tablet   Take 1 tablet as needed by oral route.  ondansetron hcl (ZOFRAN) 8 mg tablet Take 8 mg by mouth.  multivitamin (ONE A DAY) tablet Take 1 Tab by mouth daily.  diazePAM (VALIUM) 5 mg tablet Take 1 Tab by mouth every eight (8) hours as needed for Anxiety (spasm). Max Daily Amount: 15 mg.    busPIRone (BUSPAR) 7.5 mg tablet Take 2 Tabs by mouth three (3) times daily.  levonorgestrel-ethinyl estradiol (LARISSIA) 0.1-20 mg-mcg tab Take  by mouth.  ondansetron hcl (ZOFRAN) 8 mg tablet Take 1 Tab by mouth every eight (8) hours as needed for Nausea.  indomethacin SR (INDOCIN SR) 75 mg SR capsule Take 1 Cap by mouth daily for 30 days. No current facility-administered medications for this visit. Allergies   Allergen Reactions    Amoxicillin Rash     Past Surgical History:   Procedure Laterality Date    HX ANKLE FRACTURE TX Left     HX BREAST BIOPSY Right 07/20/2018    Ultrasound Biopsy    HX BREAST BIOPSY Right 8/15/2019    Excisional Biopsy of right Breast Mass performed by Karyna Ortiz [facility] at [facility]    HX PELVIC LAPAROSCOPY      HX WISDOM TEETH EXTRACTION       Social History     Occupational History    Not on file   Tobacco Use    Smoking status: Former Smoker     Types: Cigarettes    Smokeless tobacco: Never Used   Substance and Sexual Activity    Alcohol use: Yes     Alcohol/week: 2.0 standard drinks     Types: 2 Shots of liquor per week    Drug use: No    Sexual activity: Yes     Birth control/protection: Pill     Family History   Problem Relation Age of Onset    Cancer Maternal Grandmother 61        colon    Cancer Maternal Grandfather 61        colon    Heart Disease Maternal Grandfather     Cancer Paternal Grandmother         breast cancer    Breast Cancer Paternal Grandmother         aged 46s    Colon Cancer Paternal Grandmother     Cancer Paternal Grandfather     Breast Cancer Paternal Aunt     Breast Cancer Paternal Aunt     Breast Cancer Paternal Aunt     Breast Cancer Paternal Aunt     Depression Mother     Anxiety Mother     Hypertension Father         REVIEW OF SYSTEMS : 9/10/2019  ALL BELOW ARE Negative except : SEE HPI     Constitutional: Negative for fever, chills and weight loss. Neg Weight Loss  Cardiovascular: Negative for chest pain, claudication and leg swelling.  SOB, MADDOX   Gastrointestinal/Urological: Negative for  pain, N/V/D/C, Blood in stool or urine,dysuria Hematuria, Incontinence, pelvic pain  Musculoskeletal: see HPI. Neurological: Negative for dizziness and weakness, headaches,Visual Changes             Confusion,  Or Seizures,   Psychiatric/Behavioral: Negative for depression, memory loss and substance abuse. Extremities:  Negative for hair changes, rash or skin lesion changes. Hematologic: Negative for Bleeding problems, bruising, pallor or swollen lymph nodes. Peripheral Vascular: No calf pain, vascular vein tenderness to calf pain              No calf throbbing, posterior knee throbbing pain     DIAGNOSTIC IMAGING     No notes on file    Please see above section of this report. I have personally reviewed the results of the above study. The interpretation of this study is my professional opinion. Written by Brisa Chavez, as dictated by Dr. Mitzy Marcial. I, Dr. Mitzy Marcial, confirm that all documentation is accurate.

## 2019-09-11 DIAGNOSIS — M65.9 SYNOVITIS OF LEFT ANKLE: ICD-10-CM

## 2019-09-24 ENCOUNTER — DOCUMENTATION ONLY (OUTPATIENT)
Dept: ORTHOPEDIC SURGERY | Age: 29
End: 2019-09-24

## 2019-09-24 NOTE — PROGRESS NOTES
staff rcvd a fax at [facility] of forms from [employer]. Patient will  paperwork when completed & or faxed at the [facility] location. Patient can be reached at [phone].

## 2019-09-25 DIAGNOSIS — M65.9 SYNOVITIS OF LEFT ANKLE: ICD-10-CM

## 2019-09-25 RX ORDER — FAMOTIDINE 40 MG/1
TABLET, FILM COATED ORAL
Qty: 30 TAB | Refills: 0 | Status: SHIPPED | OUTPATIENT
Start: 2019-09-25

## 2019-09-30 ENCOUNTER — DOCUMENTATION ONLY (OUTPATIENT)
Dept: ORTHOPEDIC SURGERY | Age: 29
End: 2019-09-30

## 2019-09-30 NOTE — PROGRESS NOTES
FMLA form completed and patient notified that she can  a copy at [facility] .

## 2019-10-09 ENCOUNTER — OFFICE VISIT (OUTPATIENT)
Dept: ORTHOPEDIC SURGERY | Age: 29
End: 2019-10-09

## 2019-10-09 VITALS
HEART RATE: 101 BPM | OXYGEN SATURATION: 100 % | HEIGHT: 64 IN | SYSTOLIC BLOOD PRESSURE: 122 MMHG | WEIGHT: 151 LBS | DIASTOLIC BLOOD PRESSURE: 86 MMHG | BODY MASS INDEX: 25.78 KG/M2 | TEMPERATURE: 96.3 F

## 2019-10-09 DIAGNOSIS — M65.9 SYNOVITIS OF LEFT ANKLE: Primary | ICD-10-CM

## 2019-10-09 DIAGNOSIS — Z01.818 PRE-OPERATIVE EXAMINATION: ICD-10-CM

## 2019-10-09 RX ORDER — FLUCONAZOLE 150 MG/1
150 TABLET ORAL DAILY
Qty: 1 TAB | Refills: 1 | Status: SHIPPED | OUTPATIENT
Start: 2019-10-09 | End: 2019-10-10

## 2019-10-09 RX ORDER — ONDANSETRON HYDROCHLORIDE 8 MG/1
8 TABLET, FILM COATED ORAL
Qty: 42 TAB | Refills: 0 | Status: SHIPPED | OUTPATIENT
Start: 2019-10-09

## 2019-10-09 RX ORDER — HYDROCODONE BITARTRATE AND ACETAMINOPHEN 7.5; 325 MG/1; MG/1
1-2 TABLET ORAL
Qty: 42 TAB | Refills: 0 | Status: SHIPPED | OUTPATIENT
Start: 2019-10-09 | End: 2019-10-22 | Stop reason: SDUPTHER

## 2019-10-09 NOTE — PATIENT INSTRUCTIONS
Dr. Jose Davidson Pre-operative Instructions:      Patient: Batool Bhat   :  1990     I understand I am to stop taking oral birth control pills, hormonal replacement therapy and all Aspirin, Aspirin containing medications, Non-Steroidal Anti-Inflammatory medications (such as Advil, Aleve, Motrin, Ibuprofen) and or Blood thinner medication such as Coumadin, Plavix, Heparin or others 5 days prior to surgery. I understand I am to STOP taking these Medications 5 days prior to surgery:  I am to get instructions from my prescribing physician. 1. __As listed above_______________________  2. _____________________________________  3. _____________________________________  4. _____________________________________    I understand that if I am taking daily medications for high blood pressure, I can take them the morning of surgery with a small sip of water. I will consult my prescribing physician or call [facility] with specific questions. I also understand that:     I am to report important observations or changes that may occur prior to surgery. If I have any changes in my physical condition, such as a rash, a fever, sore throat, abscess, ulcers, nausea, vomiting, or diarrhea. I am to call the office and I am to consult my primary care physician to assess and treat the problem.  I am not to eat or drink anything after midnight the night before my surgery.  I am not to drink alcoholic beverages 24 hours prior to surgery.  I am not to do any illegal drugs prior to surgery.  I am not to smoke at least 24 hours prior to surgery.  I am able and will shower or bathe before surgery. I will use the Hibiclens solution on my surgical site only. The hibiclens directions are one packet a day starting two days before surgery.  I will remove any nail polish, make-up or jewelry prior to arriving for my surgery.       If I wear glasses, contact lenses or dentures they must be removed prior to going to the operating room.  All body piercing and artifical eye-lashes must be removed prior to surgery     I will not wear any aerosol sprays, perfumes or skin creams.  I am to make arrangements for a family member or friend to accompany me to surgery and take me home after my surgery as I will not be allowed to leave the hospital alone. A cab or bus will not be acceptable. Please make arrange for someone to stay with you for 24 hours after surgery.  Patient has expressed understanding of the diagnosis, treatment and planned surgery        Surgery: What to Expect at [address] [place] [address]  This care sheet gives you a general idea about how long it will take for you to recover from your surgery. But each person recovers at a different pace. How can you care for yourself at home? Activity  · Allow your body to heal. Don't move quickly or lift anything heavy until you are feeling better. · Rest when you feel tired. · Your doctor may give you specific instructions on when you can do your normal activities again, such as driving and going back to work. · Be active. Walking is a good choice. Diet  · You can eat your normal diet when you feel well. If your stomach is upset, try bland, low-fat foods like plain rice, broiled chicken, toast, and yogurt. · If your bowel movements are not regular right after surgery, try to avoid constipation and straining. Drink plenty of water. Your doctor may suggest fiber, a stool softener, or a mild laxative. Medicines  · Your doctor will tell you if and when you can restart your medicines. He or she will also give you instructions about taking any new medicines. · If you take blood thinners, such as warfarin (Coumadin), clopidogrel (Plavix), or aspirin, be sure to talk to your doctor. He or she will tell you if and when to start taking those medicines again. Make sure that you understand exactly what your doctor wants you to do. · Be safe with medicines.  Read and follow all instructions on the label. ¨ If the doctor gave you a prescription medicine for pain, take it as prescribed. ¨ If you are not taking a prescription pain medicine, ask your doctor if you can take an over-the-counter medicine. Incision care  · You will have a dressing over the cut (incision). A dressing helps the incision heal and protects it. Your doctor will tell you how to take care of this. · If you have strips of tape on the cut the doctor made, leave the tape on for a week or until it falls off. · If you had stitches, your doctor will tell you when to come back to have them removed. · If you have skin adhesive on the cut, leave it on until it falls off. Skin adhesive is also called liquid stitches. · Change the bandage every day. · Wash the area daily with warm, soapy water, and pat it dry. Don't use hydrogen peroxide or alcohol. They can slow healing. · You may cover the area with a gauze bandage if it oozes fluid or rubs against clothing. · You may shower 24 to 48 hours after surgery. Pat the incision dry. Don't swim or take a bath for the first 2 weeks, or until your doctor tells you it is okay. Follow-up care is a key part of your treatment and safety. Be sure to make and go to all appointments, and call your doctor if you are having problems. It's also a good idea to know your test results and keep a list of the medicines you take. When should you call for help? Call 911 anytime you think you may need emergency care. For example, call if:  · You passed out (lost consciousness). · You have severe trouble breathing. · You have sudden chest pain and shortness of breath, or you cough up blood. Call your doctor now or seek immediate medical care if:  · You have pain that does not get better after you take pain medicine. · You have loose stitches, or your incision comes open. · You are bleeding through your dressing.  A small amount of blood is normal.  · You have signs of infection, such as:  ¨ Increased pain, swelling, warmth, or redness. ¨ Red streaks leading from the incision. ¨ Pus draining from the incision. ¨ A fever. · You have symptoms of a blood clot in your arm or leg (called a deep vein thrombosis). These may include:  ¨ Pain in your calf, back of the knee, thigh, or groin. ¨ Redness and swelling in the arm, leg, or groin. Watch closely for any changes in your health, and be sure to contact your doctor if:  · You do not have a bowel movement after taking a laxative. Where can you learn more? Go to http://sarah beth-aylin.info/  Enter Z012 in the search box to learn more about \"Surgery: What to Expect at Home. \"  © 0791-1967 [employer]. Care instructions adapted under license by "[employer]" (which disclaims liability or warranty for this information). This care instruction is for use with your licensed healthcare professional. If you have questions about a medical condition or this instruction, always ask your healthcare professional. [place] [address] any warranty or liability for your use of this information. Content Version: 96.4.604917; Current as of: November 20, 2015          Acute Pain After Surgery: Care Instructions  Your Care Instructions      It's common to have some pain after surgery. Pain doesn't mean that something is wrong or that the surgery didn't go well. But when the pain is severe, it's important to work with your doctor to manage it. It's also important to be aware of a few facts about pain and pain medicine. · You are the only person who knows what your pain feels like. So be sure to tell your doctor when you are in pain or when the pain changes. Then he or she will know how to adjust your medicines. · Pain is often easier to control right after it starts. So it may be better to take regular doses of pain medicine and not wait until the pain gets bad. · Medicine can help control pain.  But this doesn't mean you'll have no pain. Medicine works to keep the pain at a level you can live with. With time, you will feel better. Follow-up care is a key part of your treatment and safety. Be sure to make and go to all appointments, and call your doctor if you are having problems. It's also a good idea to know your test results and keep a list of the medicines you take. How can you care for yourself at home? · Be safe with medicines. Read and follow all instructions on the label. ¨ If the doctor gave you a prescription medicine for pain, take it as prescribed. ¨ If you are not taking a prescription pain medicine, ask your doctor if you can take an over-the-counter medicine. · If you take an over-the-counter pain medicine, such as acetaminophen (Tylenol), ibuprofen (Advil, Motrin), or naproxen (Aleve), read and follow all instructions on the label. · Do not take two or more pain medicines at the same time unless the doctor told you to. · Do not drink alcohol while you are taking pain medicines. · Try to walk each day if your doctor recommends it. Start by walking a little more than you did the day before. Bit by bit, increase the amount you walk. Walking increases blood flow. It also helps prevent pneumonia and constipation. · To prevent constipation from opioid pain medicines:  ¨ Talk to your doctor about a laxative. ¨ Include fruits, vegetables, beans, and whole grains in your diet each day. These foods are high in fiber. ¨ Drink plenty of fluids, enough so that your urine is light yellow or clear like water. Drink water, fruit juice, or other drinks that do not contain caffeine or alcohol. If you have kidney, heart, or liver disease and have to limit fluids, talk with your doctor before you increase the amount of fluids you drink. ¨ Take a fiber supplement, such as Citrucel or Metamucil, every day if needed. Read and follow all instructions on the label.  If you take pain medicine for more than a few days, talk to your doctor before you take fiber. When should you call for help? Call your doctor now or seek immediate medical care if:  ? · Your pain gets worse. ? · Your pain is not controlled by medicine. ? Watch closely for changes in your health, and be sure to contact your doctor if you have any problems. Where can you learn more? Go to http://sarah beth-aylin.info/. Enter (06) 801-409 in the search box to learn more about \"Acute Pain After Surgery: Care Instructions. \"  Current as of: March 20, 2017  Content Version: 11.4  © 5745-3286 [employer]. Care instructions adapted under license by [employer] (which disclaims liability or warranty for this information). If you have questions about a medical condition or this instruction, always ask your healthcare professional. [address] any warranty or liability for your use of this information.

## 2019-10-09 NOTE — H&P
FOOT AND ANKLE HISTORY AND PHYSICAL      Patient: Meg Rizo                   MRN: [id number]         SSN: [id number]  YOB: 1990                AGE: 30 y.o. SEX: female    Patient scheduled for:  Left ankle arthroscopic lavage, synovial biopsy and cultures   Date of surgery: 10/17/19   Location of Surgery: [address] [facility]  Surgeon: Da Louie. MD Victor Hugo  ANESTHESIA TYPE:  General, Popliteal block          PRESCRIPTIONS AND/OR ORDERS PROVIDED DURING H&P:    Orders Placed This Encounter    Generic Supply Order    ondansetron hcl (ZOFRAN) 8 mg tablet    HYDROcodone-acetaminophen (NORCO) 7.5-325 mg per tablet    fluconazole (DIFLUCAN) 150 mg tablet              HISTORY:     The patient was seen in the office today for a preoperative history and physical for an upcoming above listed surgery. The patient is a pleasant 30 y.o. female who has a history of synovitis of the left ankle. Since we saw her last, Ms. Christie Denny states that her swelling had gone down and that the Indocin was helping with her swelling and pain, but this medication bothered her stomach. As such, she had to stop taking this medication. She reports that she has tried Celebrex and that this medication did not help with her swelling. She notes that she has been walking without her boot in the home. However, she recalls that she went to the store once without her boot and that going around the store was causing her discomfort and pain. She states that she would like to start planning for surgery. The plan for her would be arthroscopic lavage, arthroscopic synovial biopsy of her left ankle. Due to the current findings, affected activity of daily living and continued pain and discomfort, surgical intervention is indicated.  The alternatives, risks, and complications, including but not limited to infection, blood loss, need for blood transfusion, neurovascular damage, tereza-incisional numbness, subcutaneous hematoma, bone fracture, anesthetic complications, DVT, PE, death, RSD, postoperative stiffness and pain, possible surgical scar, delayed healing and nonhealing, reflexive sympathetic dystrophy, damage to blood vessels and nerves, need for more surgery, MI, and stroke have been discussed. The patient understands and wishes to proceed with surgery. Patient is on oral birth control, but can not use blood thinner following surgery. She recently had a mass removed from her breast and has a hematoma. We will not prescribe any ASA, but will use SCD provided at [address] [phone] instead. The patient smokes half a pack of cigarettes a day. PAST MEDICAL HISTORY:     Past Medical History:   Diagnosis Date    Anxiety     Depression     Depression     Eating disorder     Endometriosis     Multiple body piercings     Nausea & vomiting     PONV (postoperative nausea and vomiting)     SBO (small bowel obstruction) ([facility])        CURRENT MEDICATIONS:     Current Outpatient Medications   Medication Sig Dispense Refill    ondansetron hcl (ZOFRAN) 8 mg tablet Take 1 Tab by mouth every eight (8) hours as needed for Nausea. 42 Tab 0    HYDROcodone-acetaminophen (NORCO) 7.5-325 mg per tablet Take 1-2 Tabs by mouth every six (6) hours as needed for Pain for up to 7 days. Max Daily Amount: 8 Tabs. FOR PAIN **AFTER SURGERY**DO NOT TAKE BEFORE SURGERY  Indications: pain 42 Tab 0    fluconazole (DIFLUCAN) 150 mg tablet Take 1 Tab by mouth daily for 1 day. 1 Tab 1    meloxicam (MOBIC) 15 mg tablet Take 1 Tab by mouth daily (with breakfast). 30 Tab 0    famotidine (PEPCID) 40 mg tablet Take 1 Tab by mouth daily. 30 Tab 0    ondansetron (ZOFRAN ODT) 8 mg disintegrating tablet Take 1 Tab by mouth every eight (8) hours as needed for Nausea. 30 Tab 1    ALPRAZolam (XANAX) 1 mg tablet Xanax 1 mg tablet   Take 1 tablet as needed by oral route.       multivitamin (ONE A DAY) tablet Take 1 Tab by mouth daily.  diazePAM (VALIUM) 5 mg tablet Take 1 Tab by mouth every eight (8) hours as needed for Anxiety (spasm). Max Daily Amount: 15 mg. 60 Tab 2    busPIRone (BUSPAR) 7.5 mg tablet Take 2 Tabs by mouth three (3) times daily. 180 Tab 1    levonorgestrel-ethinyl estradiol (LARISSIA) 0.1-20 mg-mcg tab Take  by mouth.  famotidine (PEPCID) 40 mg tablet TAKE 1 TABLET BY MOUTH EVERY DAY 30 Tab 0       ALLERGIES:     Allergies   Allergen Reactions    Amoxicillin Rash         SURGICAL HISTORY:     Past Surgical History:   Procedure Laterality Date    HX ANKLE FRACTURE TX Left     HX BREAST BIOPSY Right 07/20/2018    Ultrasound Biopsy    HX BREAST BIOPSY Right 8/15/2019    Excisional Biopsy of right Breast Mass performed by Manjula العلي MD at [facility] MAIN OR    HX PELVIC LAPAROSCOPY      HX WISDOM TEETH EXTRACTION         SOCIAL HISTORY:     Social History     Socioeconomic History    Marital status: SINGLE     Spouse name: Not on file    Number of children: Not on file    Years of education: Not on file    Highest education level: Not on file   Tobacco Use    Smoking status: Current Every Day Smoker     Packs/day: 0.50     Types: Cigarettes    Smokeless tobacco: Never Used   Substance and Sexual Activity    Alcohol use:  Yes     Alcohol/week: 2.0 standard drinks     Types: 2 Shots of liquor per week    Drug use: No    Sexual activity: Yes     Birth control/protection: Pill       FAMILY HISTORY:     Family History   Problem Relation Age of Onset    Cancer Maternal Grandmother 61        colon    Cancer Maternal Grandfather 61        colon    Heart Disease Maternal Grandfather     Cancer Paternal Grandmother         breast cancer    Breast Cancer Paternal Grandmother         aged 46s    Colon Cancer Paternal Grandmother     Cancer Paternal Grandfather     Breast Cancer Paternal Aunt     Breast Cancer Paternal Aunt     Breast Cancer Paternal Aunt     Breast Cancer Paternal Aunt     Depression Mother     Anxiety Mother     Hypertension Father        REVIEW OF SYSTEMS:     Negative for fevers, chills, chest pain, shortness of breath, weight loss, recent illness     General: Negative for fever and chills. No unexpected change in weight. Denies fatigue. No change in appetite. Skin: Negative for rash or itching. HEENT: Negative for congestion, sore throat, neck pain and neck stiffness. No change in vision or hearing. Hasn't noted any enlarged lymph nodes in the neck. Cardiovascular:  Negative for chest pain and palpitations. Has not noted pedal edema. Respiratory: Negative for cough, colds, sinus, hemoptysis, shortness of breath and wheezing. Gastrointestinal: Negative for nausea and vomiting, rectal bleeding, coffee ground emesis, abdominal pain, diarrhea and constipation. Genitourinary: Negative for dysuria, frequency urgency, or burning on micturition. No flank pain, no foul smelling urine, no difficulty with initiating urination. Hematological: Negative for bleeding or easy bruising. Musculoskeletal: Negative  for arthralgias, back pain or neck pain. Neurological: Negative for dizziness, seizures or syncopal episodes. Denies headaches. Endocrine: Denies excessive thirst.  No heat/cold intolerance. Psychiatric: Negative for depression or insomnia.     PHYSICAL EXAMINATION:     VITALS:   Visit Vitals  /86   Pulse (!) 101   Temp 96.3 °F (35.7 °C) (Oral)   Ht 5' 4\" (1.626 m)   Wt 151 lb (68.5 kg)   SpO2 100%   BMI 25.92 kg/m²       Pain Assessment  10/9/2019   Location of Pain Ankle   Location Modifiers Left   Severity of Pain 3   Quality of Pain Throbbing   Duration of Pain A few hours   Frequency of Pain Several times daily   Aggravating Factors Standing;Walking   Limiting Behavior Some   Relieving Factors Rest;Elevation;Heat;Ice   Result of Injury Yes   Work-Related Injury No   Type of Injury Fall        Pain Location: Ankle (left)      GEN:  Well developed, well nourished 30 y.o. female in no acute distress. PSYCH: Alert an oriented to person, place and time. Mood, memory, affect, behavior and judgment normal  HEENT: Normocephalic and atraumatic. Eyes: Conjunctivae and EOM are normal.Pupils are equal, round, and reactive to light. External ear normal appearance, external nose normal appearing. Mouth/Throat: Oropharynx is clear and moist, able to handle oral secretions w/out difficulty, airway patent. NECK: Supple. Normal ROM, No lymphadenopathy. Trachea is midline. No bruising, swelling or deformity  RESP: Clear to auscultation bilaterally. No wheezes, rales, rhonchi. Normal effort and breath sounds. No respiratory distress  CARDIO:  Normal rate, regular rhythm and normal heart sounds. No MGR. ABDOMEN: Soft, non-tender, non-distended, normoactive bowel sounds in all four quadrants. There is no tenderness. There is no rebound and no guarding. BACK: No CVA or spinal tenderness  BREAST:  Deferred  PELVIC:    Deferred   RECTAL:  Deferred   :           Deferred  EXTREMITIES: EXAMINATION OF:   Musculoskeletal:  LOCATION:  Left FOOT/ANKLE  Integumentary: No rashes, skin patches, wounds, or abrasions to the right or left legs                             Warm and normal color. No regions of expressible drainage. Moderate anterolateral and anteromedial swelling, no redness, no erythema, slightly warm, slightly improved since last visit  Tenderness: Mild tenderness to the anterolateral ankle    Mild tenderness to the medial gutter of the ankle. Mild tenderness to the peroneal tendons        Motor Strength/Tone Exam: Normal to the toes with respect to extension/flexion      Sensory Exam:   Intact Normal Sensation to ankle/foot.        Stability Testing: Peroneal tendon instability :  Not tested or conducted due to tenderness              Instability of ankle LESS THAN, 1+ anterior drawer, Varus Instability   Not tested or conducted due to tendernessSubtalar instability  Not tested or conducted due to tenderness. ROM: Decreased ROM noted to ankle                 Normal Hindfoot (Subtalar, CC, TN regions)                   Normal Forefoot         Contractures: No Achilles or Gastrocnemius Contractures      Calf tenderness: Absent for calf or gastrocnemius muscle regions       Soft, supple, non tender, non taut lower extremity compartments       Alignment: Neutral Hindfoot  Wounds/Abrasions:   None present  Extremities:   No embolic phenomena to the toes or hands                          No significant edema to the foot and or toes. Lower extremities are warm and appear well perfused                          DVT: No evidence of DVT seen on examination at this time                                      No calf swelling, no tenderness to calf muscles  Lymphatic:  No Evidence of Lymphedema  Vascular: Medial Border of Tibia Region: Edema is not present                   Pulses: Dorsalis Pedis &  Posterior Tibial Pulses : Palpable yes                   Varicosities Lower Limbs :  None    Neuro: Negative bilateral Straight leg raise (seated position)               See Musculoskeletal section for pertinent individual extremity examination               No abnormal hand/wrist, foot/ankle, or facial/neck tremors       RADIOGRAPHS & DIAGNOSTIC STUDIES:     No notes on file     MRI Results (most recent):  Results from Hospital Encounter encounter on 05/04/19   MRI ANKLE LT WO CONT    Narrative EXAM: MRI OF THE LEFT ANKLE    CLINICAL INDICATIONS/HISTORY: Ankle instability, medial ankle pain.  Evaluation  for posterior tibial tendon tear    COMPARISON: None     TECHNIQUE: Sagittal T1 and STIR; axial PD and T2 with fat saturation; coronal T2  with fat saturation and axial oblique PD images of the ankle were obtained.    _______________    FINDINGS:    ANKLE LIGAMENTS:  Anterior and posterior inferior tibiofibular ligaments and syndesmosis: Intact  Anterior and posterior talofibular and the calcaneofibular ligaments: No  evidence of prior ligamentous reconstruction involving the anterior talo fibular  ligament. The reconstruction portion of the ligament appears thickened, and  somewhat lax in appearance but appears intact. Calcaneofibular and posterior  talofibular ligaments appear intact. Deltoid and spring ligaments: Superficial and deep components of the deltoid  appear within normal limits. ANKLE AND FOOT TENDONS:  Peroneus longus and brevis tendons: The peroneal tendons are normally located. Peroneal tendons appear intact as visualized. No significant tenosynovitis. Tibialis posterior tendon: Intact and normal in signal intensity. Flexor digitorum longus and flexor hallucis longus tendons: Intact and normal in  signal intensity. Extensor tendons: Intact and normal in signal intensity. Achilles tendon: Achilles tendon is normal in morphology and signal intensity. Kager's fat pad is unremarkable. No retrocalcaneal bursitis. OSSEOUS:  Ankle: Large ankle joint effusion/synovitis noted with intermediate signal  intensity material noted along the anterior, lateral, and posterior gutters. No  fracture or contusion. Intact talar dome and symmetric ankle mortise. No  degenerative osteoarthropathy of the ankle. Hindfoot: No fracture or contusion. No hindfoot coalition. No degenerative  osteoarthropathy of the subtalar joints. Midfoot: No fracture or contusion. No degenerative osteoarthropathy of the  intertarsal articulations. OTHER:  Sinus tarsi: Subtotal infiltration of the sinus tarsi is present. Plantar fascia: Intact, normal in caliber and signal intensity. Tarsal tunnel: Normal in appearance. Regional soft tissues: No abnormal soft tissue fluid collections. _______________      Impression IMPRESSION:      1. Evidence of prior lateral ligamentous reconstruction.  The anterior  talofibular ligament graft fibers appear mildly thickened and somewhat lax but  intact in appearance. 2. Remaining lateral ankle ligaments, as well as syndesmotic ligament superior  intact. 3. Considerable tibiotalar synovitis with expansion of the anterior, posterior,  and lateral gutters. 4. Edematous infiltration of the sinus tarsi, likely reactive in nature. LABS:     CBC:   Lab Results   Component Value Date/Time    WBC 7.6 09/03/2019 11:30 AM    RBC 4.49 09/03/2019 11:30 AM    HGB 14.1 09/03/2019 11:30 AM    HCT 42.8 09/03/2019 11:30 AM    PLATELET 227 86/84/5725 11:30 AM   , CMP:   Lab Results   Component Value Date/Time    Glucose 90 09/03/2019 11:30 AM    Sodium 140 09/03/2019 11:30 AM    Potassium 4.5 09/03/2019 11:30 AM    Chloride 104 09/03/2019 11:30 AM    CO2 24 09/03/2019 11:30 AM    BUN 8 09/03/2019 11:30 AM    Creatinine 0.8 09/03/2019 11:30 AM    Calcium 9.9 09/03/2019 11:30 AM    Anion gap 12.0 09/03/2019 11:30 AM    BUN/Creatinine ratio 15 06/19/2017 11:02 AM    Alk. phosphatase 72 09/03/2019 11:30 AM    Protein, total 7.2 09/03/2019 11:30 AM    Albumin 4.4 09/03/2019 11:30 AM    Globulin 2.8 09/03/2019 11:30 AM    A-G Ratio 1.6 09/03/2019 11:30 AM    and Coagulation:   Lab Results   Component Value Date/Time    Prothrombin time 10.1 09/03/2019 11:30 AM    INR 0.95 09/03/2019 11:30 AM         ASSESSMENT:       Encounter Diagnoses   Name Primary?  Synovitis of left ankle Yes    Pre-operative examination        PLAN:     Again, the alternatives, risks, and complications, as well as expected outcome were discussed. The patient understands and agrees to proceed with the above listed surgery. Patient has been given Hibiclens wash with instructions and/or prescriptions or orders listed above.     Yasemin Gomez PA-C  10/9/2019  10:25 AM

## 2019-10-09 NOTE — PROGRESS NOTES
FOOT AND ANKLE HISTORY AND PHYSICAL      Patient: Ricky Huynh                   MRN: [id number]         SSN: [id number]  YOB: 1990                AGE: 30 y.o. SEX: female    Patient scheduled for:  Left ankle arthroscopic lavage, synovial biopsy and cultures   Date of surgery: 10/17/19   Location of Surgery: [address] [facility]  Surgeon: Amanda Shirley. MD Victor Hugo  ANESTHESIA TYPE:  General, Popliteal block          PRESCRIPTIONS AND/OR ORDERS PROVIDED DURING H&P:    Orders Placed This Encounter    Generic Supply Order    ondansetron hcl (ZOFRAN) 8 mg tablet    HYDROcodone-acetaminophen (NORCO) 7.5-325 mg per tablet    fluconazole (DIFLUCAN) 150 mg tablet              HISTORY:     The patient was seen in the office today for a preoperative history and physical for an upcoming above listed surgery. The patient is a pleasant 30 y.o. female who has a history of synovitis of the left ankle. Since we saw her last, Ms. Carmen Aceves states that her swelling had gone down and that the Indocin was helping with her swelling and pain, but this medication bothered her stomach. As such, she had to stop taking this medication. She reports that she has tried Celebrex and that this medication did not help with her swelling. She notes that she has been walking without her boot in the home. However, she recalls that she went to the store once without her boot and that going around the store was causing her discomfort and pain. She states that she would like to start planning for surgery. The plan for her would be arthroscopic lavage, arthroscopic synovial biopsy of her left ankle. Due to the current findings, affected activity of daily living and continued pain and discomfort, surgical intervention is indicated.  The alternatives, risks, and complications, including but not limited to infection, blood loss, need for blood transfusion, neurovascular damage, tereza-incisional numbness, subcutaneous hematoma, bone fracture, anesthetic complications, DVT, PE, death, RSD, postoperative stiffness and pain, possible surgical scar, delayed healing and nonhealing, reflexive sympathetic dystrophy, damage to blood vessels and nerves, need for more surgery, MI, and stroke have been discussed. The patient understands and wishes to proceed with surgery. Patient is on oral birth control, but can not use blood thinner following surgery. She recently had a mass removed from her breast and has a hematoma. We will not prescribe any ASA, but will use SCD provided at [address] [facility] instead. The patient smokes half a pack of cigarettes a day. PAST MEDICAL HISTORY:     Past Medical History:   Diagnosis Date    Anxiety     Depression     Depression     Eating disorder     Endometriosis     Multiple body piercings     Nausea & vomiting     PONV (postoperative nausea and vomiting)     SBO (small bowel obstruction) ([facility])        CURRENT MEDICATIONS:     Current Outpatient Medications   Medication Sig Dispense Refill    ondansetron hcl (ZOFRAN) 8 mg tablet Take 1 Tab by mouth every eight (8) hours as needed for Nausea. 42 Tab 0    HYDROcodone-acetaminophen (NORCO) 7.5-325 mg per tablet Take 1-2 Tabs by mouth every six (6) hours as needed for Pain for up to 7 days. Max Daily Amount: 8 Tabs. FOR PAIN **AFTER SURGERY**DO NOT TAKE BEFORE SURGERY  Indications: pain 42 Tab 0    fluconazole (DIFLUCAN) 150 mg tablet Take 1 Tab by mouth daily for 1 day. 1 Tab 1    meloxicam (MOBIC) 15 mg tablet Take 1 Tab by mouth daily (with breakfast). 30 Tab 0    famotidine (PEPCID) 40 mg tablet Take 1 Tab by mouth daily. 30 Tab 0    ondansetron (ZOFRAN ODT) 8 mg disintegrating tablet Take 1 Tab by mouth every eight (8) hours as needed for Nausea. 30 Tab 1    ALPRAZolam (XANAX) 1 mg tablet Xanax 1 mg tablet   Take 1 tablet as needed by oral route.       multivitamin (ONE A DAY) tablet Take 1 Tab by mouth daily.  diazePAM (VALIUM) 5 mg tablet Take 1 Tab by mouth every eight (8) hours as needed for Anxiety (spasm). Max Daily Amount: 15 mg. 60 Tab 2    busPIRone (BUSPAR) 7.5 mg tablet Take 2 Tabs by mouth three (3) times daily. 180 Tab 1    levonorgestrel-ethinyl estradiol (LARISSIA) 0.1-20 mg-mcg tab Take  by mouth.  famotidine (PEPCID) 40 mg tablet TAKE 1 TABLET BY MOUTH EVERY DAY 30 Tab 0       ALLERGIES:     Allergies   Allergen Reactions    Amoxicillin Rash         SURGICAL HISTORY:     Past Surgical History:   Procedure Laterality Date    HX ANKLE FRACTURE TX Left     HX BREAST BIOPSY Right 07/20/2018    Ultrasound Biopsy    HX BREAST BIOPSY Right 8/15/2019    Excisional Biopsy of right Breast Mass performed by Kenny Madrid MD at [facility] MAIN OR    HX PELVIC LAPAROSCOPY      HX WISDOM TEETH EXTRACTION         SOCIAL HISTORY:     Social History     Socioeconomic History    Marital status: SINGLE     Spouse name: Not on file    Number of children: Not on file    Years of education: Not on file    Highest education level: Not on file   Tobacco Use    Smoking status: Current Every Day Smoker     Packs/day: 0.50     Types: Cigarettes    Smokeless tobacco: Never Used   Substance and Sexual Activity    Alcohol use:  Yes     Alcohol/week: 2.0 standard drinks     Types: 2 Shots of liquor per week    Drug use: No    Sexual activity: Yes     Birth control/protection: Pill       FAMILY HISTORY:     Family History   Problem Relation Age of Onset    Cancer Maternal Grandmother 61        colon    Cancer Maternal Grandfather 61        colon    Heart Disease Maternal Grandfather     Cancer Paternal Grandmother         breast cancer    Breast Cancer Paternal Grandmother         aged 46s    Colon Cancer Paternal Grandmother     Cancer Paternal Grandfather     Breast Cancer Paternal Aunt     Breast Cancer Paternal Aunt     Breast Cancer Paternal Aunt     Breast Cancer Paternal Aunt     Depression Mother     Anxiety Mother     Hypertension Father        REVIEW OF SYSTEMS:     Negative for fevers, chills, chest pain, shortness of breath, weight loss, recent illness     General: Negative for fever and chills. No unexpected change in weight. Denies fatigue. No change in appetite. Skin: Negative for rash or itching. HEENT: Negative for congestion, sore throat, neck pain and neck stiffness. No change in vision or hearing. Hasn't noted any enlarged lymph nodes in the neck. Cardiovascular:  Negative for chest pain and palpitations. Has not noted pedal edema. Respiratory: Negative for cough, colds, sinus, hemoptysis, shortness of breath and wheezing. Gastrointestinal: Negative for nausea and vomiting, rectal bleeding, coffee ground emesis, abdominal pain, diarrhea and constipation. Genitourinary: Negative for dysuria, frequency urgency, or burning on micturition. No flank pain, no foul smelling urine, no difficulty with initiating urination. Hematological: Negative for bleeding or easy bruising. Musculoskeletal: Negative  for arthralgias, back pain or neck pain. Neurological: Negative for dizziness, seizures or syncopal episodes. Denies headaches. Endocrine: Denies excessive thirst.  No heat/cold intolerance. Psychiatric: Negative for depression or insomnia.     PHYSICAL EXAMINATION:     VITALS:   Visit Vitals  /86   Pulse (!) 101   Temp 96.3 °F (35.7 °C) (Oral)   Ht 5' 4\" (1.626 m)   Wt 151 lb (68.5 kg)   SpO2 100%   BMI 25.92 kg/m²       Pain Assessment  10/9/2019   Location of Pain Ankle   Location Modifiers Left   Severity of Pain 3   Quality of Pain Throbbing   Duration of Pain A few hours   Frequency of Pain Several times daily   Aggravating Factors Standing;Walking   Limiting Behavior Some   Relieving Factors Rest;Elevation;Heat;Ice   Result of Injury Yes   Work-Related Injury No   Type of Injury Fall        Pain Location: Ankle (left)      GEN:  Well developed, well nourished 30 y.o. female in no acute distress. PSYCH: Alert an oriented to person, place and time. Mood, memory, affect, behavior and judgment normal  HEENT: Normocephalic and atraumatic. Eyes: Conjunctivae and EOM are normal.Pupils are equal, round, and reactive to light. External ear normal appearance, external nose normal appearing. Mouth/Throat: Oropharynx is clear and moist, able to handle oral secretions w/out difficulty, airway patent. NECK: Supple. Normal ROM, No lymphadenopathy. Trachea is midline. No bruising, swelling or deformity  RESP: Clear to auscultation bilaterally. No wheezes, rales, rhonchi. Normal effort and breath sounds. No respiratory distress  CARDIO:  Normal rate, regular rhythm and normal heart sounds. No MGR. ABDOMEN: Soft, non-tender, non-distended, normoactive bowel sounds in all four quadrants. There is no tenderness. There is no rebound and no guarding. BACK: No CVA or spinal tenderness  BREAST:  Deferred  PELVIC:    Deferred   RECTAL:  Deferred   :           Deferred  EXTREMITIES: EXAMINATION OF:   Musculoskeletal:  LOCATION:  Left FOOT/ANKLE  Integumentary: No rashes, skin patches, wounds, or abrasions to the right or left legs                             Warm and normal color. No regions of expressible drainage. Moderate anterolateral and anteromedial swelling, no redness, no erythema, slightly warm, slightly improved since last visit  Tenderness: Mild tenderness to the anterolateral ankle    Mild tenderness to the medial gutter of the ankle. Mild tenderness to the peroneal tendons        Motor Strength/Tone Exam: Normal to the toes with respect to extension/flexion      Sensory Exam:   Intact Normal Sensation to ankle/foot.        Stability Testing: Peroneal tendon instability :  Not tested or conducted due to tenderness              Instability of ankle LESS THAN, 1+ anterior drawer, Varus Instability   Not tested or conducted due to tendernessSubtalar instability  Not tested or conducted due to tenderness. ROM: Decreased ROM noted to ankle                 Normal Hindfoot (Subtalar, CC, TN regions)                   Normal Forefoot         Contractures: No Achilles or Gastrocnemius Contractures      Calf tenderness: Absent for calf or gastrocnemius muscle regions       Soft, supple, non tender, non taut lower extremity compartments       Alignment: Neutral Hindfoot  Wounds/Abrasions:   None present  Extremities:   No embolic phenomena to the toes or hands                          No significant edema to the foot and or toes. Lower extremities are warm and appear well perfused                          DVT: No evidence of DVT seen on examination at this time                                      No calf swelling, no tenderness to calf muscles  Lymphatic:  No Evidence of Lymphedema  Vascular: Medial Border of Tibia Region: Edema is not present                   Pulses: Dorsalis Pedis &  Posterior Tibial Pulses : Palpable yes                   Varicosities Lower Limbs :  None    Neuro: Negative bilateral Straight leg raise (seated position)               See Musculoskeletal section for pertinent individual extremity examination               No abnormal hand/wrist, foot/ankle, or facial/neck tremors       RADIOGRAPHS & DIAGNOSTIC STUDIES:     No notes on file     MRI Results (most recent):  Results from Hospital Encounter encounter on 05/04/19   MRI ANKLE LT WO CONT    Narrative EXAM: MRI OF THE LEFT ANKLE    CLINICAL INDICATIONS/HISTORY: Ankle instability, medial ankle pain.  Evaluation  for posterior tibial tendon tear    COMPARISON: None     TECHNIQUE: Sagittal T1 and STIR; axial PD and T2 with fat saturation; coronal T2  with fat saturation and axial oblique PD images of the ankle were obtained.    _______________    FINDINGS:    ANKLE LIGAMENTS:  Anterior and posterior inferior tibiofibular ligaments and syndesmosis: Intact  Anterior and posterior talofibular and the calcaneofibular ligaments: No  evidence of prior ligamentous reconstruction involving the anterior talo fibular  ligament. The reconstruction portion of the ligament appears thickened, and  somewhat lax in appearance but appears intact. Calcaneofibular and posterior  talofibular ligaments appear intact. Deltoid and spring ligaments: Superficial and deep components of the deltoid  appear within normal limits. ANKLE AND FOOT TENDONS:  Peroneus longus and brevis tendons: The peroneal tendons are normally located. Peroneal tendons appear intact as visualized. No significant tenosynovitis. Tibialis posterior tendon: Intact and normal in signal intensity. Flexor digitorum longus and flexor hallucis longus tendons: Intact and normal in  signal intensity. Extensor tendons: Intact and normal in signal intensity. Achilles tendon: Achilles tendon is normal in morphology and signal intensity. Kager's fat pad is unremarkable. No retrocalcaneal bursitis. OSSEOUS:  Ankle: Large ankle joint effusion/synovitis noted with intermediate signal  intensity material noted along the anterior, lateral, and posterior gutters. No  fracture or contusion. Intact talar dome and symmetric ankle mortise. No  degenerative osteoarthropathy of the ankle. Hindfoot: No fracture or contusion. No hindfoot coalition. No degenerative  osteoarthropathy of the subtalar joints. Midfoot: No fracture or contusion. No degenerative osteoarthropathy of the  intertarsal articulations. OTHER:  Sinus tarsi: Subtotal infiltration of the sinus tarsi is present. Plantar fascia: Intact, normal in caliber and signal intensity. Tarsal tunnel: Normal in appearance. Regional soft tissues: No abnormal soft tissue fluid collections. _______________      Impression IMPRESSION:      1. Evidence of prior lateral ligamentous reconstruction.  The anterior  talofibular ligament graft fibers appear mildly thickened and somewhat lax but  intact in appearance. 2. Remaining lateral ankle ligaments, as well as syndesmotic ligament superior  intact. 3. Considerable tibiotalar synovitis with expansion of the anterior, posterior,  and lateral gutters. 4. Edematous infiltration of the sinus tarsi, likely reactive in nature. LABS:     CBC:   Lab Results   Component Value Date/Time    WBC 7.6 09/03/2019 11:30 AM    RBC 4.49 09/03/2019 11:30 AM    HGB 14.1 09/03/2019 11:30 AM    HCT 42.8 09/03/2019 11:30 AM    PLATELET 957 91/19/6786 11:30 AM   , CMP:   Lab Results   Component Value Date/Time    Glucose 90 09/03/2019 11:30 AM    Sodium 140 09/03/2019 11:30 AM    Potassium 4.5 09/03/2019 11:30 AM    Chloride 104 09/03/2019 11:30 AM    CO2 24 09/03/2019 11:30 AM    BUN 8 09/03/2019 11:30 AM    Creatinine 0.8 09/03/2019 11:30 AM    Calcium 9.9 09/03/2019 11:30 AM    Anion gap 12.0 09/03/2019 11:30 AM    BUN/Creatinine ratio 15 06/19/2017 11:02 AM    Alk. phosphatase 72 09/03/2019 11:30 AM    Protein, total 7.2 09/03/2019 11:30 AM    Albumin 4.4 09/03/2019 11:30 AM    Globulin 2.8 09/03/2019 11:30 AM    A-G Ratio 1.6 09/03/2019 11:30 AM    and Coagulation:   Lab Results   Component Value Date/Time    Prothrombin time 10.1 09/03/2019 11:30 AM    INR 0.95 09/03/2019 11:30 AM         ASSESSMENT:       Encounter Diagnoses   Name Primary?  Synovitis of left ankle Yes    Pre-operative examination        PLAN:     Again, the alternatives, risks, and complications, as well as expected outcome were discussed. The patient understands and agrees to proceed with the above listed surgery. Patient has been given Hibiclens wash with instructions and/or prescriptions or orders listed above.     Valorie Crowe PA-C  10/9/2019  10:25 AM

## 2019-10-12 DIAGNOSIS — F41.9 ANXIETY: Primary | ICD-10-CM

## 2019-10-16 DIAGNOSIS — F41.9 ANXIETY: ICD-10-CM

## 2019-10-16 RX ORDER — BUSPIRONE HYDROCHLORIDE 7.5 MG/1
TABLET ORAL
Qty: 540 TAB | Refills: 0 | Status: SHIPPED | OUTPATIENT
Start: 2019-10-16

## 2019-10-17 ENCOUNTER — HOSPITAL ENCOUNTER (OUTPATIENT)
Dept: LAB | Age: 29
Discharge: HOME | End: 2019-10-17

## 2019-10-17 PROCEDURE — 88305 TISSUE EXAM BY PATHOLOGIST: CPT

## 2019-10-17 RX ORDER — ALPRAZOLAM 1 MG/1
TABLET ORAL
OUTPATIENT
Start: 2019-10-17

## 2019-10-17 RX ORDER — ALPRAZOLAM 1 MG/1
TABLET ORAL
Qty: 60 TAB | Refills: 1 | Status: SHIPPED | OUTPATIENT
Start: 2019-10-17 | End: 2020-04-13 | Stop reason: SDUPTHER

## 2019-10-18 ENCOUNTER — TELEPHONE (OUTPATIENT)
Dept: ORTHOPEDIC SURGERY | Age: 29
End: 2019-10-18

## 2019-10-18 LAB — SENTARA SPECIMEN COL,SENBCF: NORMAL

## 2019-10-18 PROCEDURE — 99001 SPECIMEN HANDLING PT-LAB: CPT

## 2019-10-18 NOTE — TELEPHONE ENCOUNTER
Pt called and stated that she was suppose to receive a rx for antibiotics , went to  rx and pharmacy stated that didn't have a rx of antibiotics for pt.       Pt can be reached at : [phone]

## 2019-10-21 NOTE — TELEPHONE ENCOUNTER
Discussed with Dr. Alec Adrian, he states that he is awaiting result of culture prior to prescribing any antibiotics.         Colten Javed PA-C  10/21/2019  12:02 PM

## 2019-10-21 NOTE — TELEPHONE ENCOUNTER
Spoke with patient and informed her of [facility]. Please do not close message. Still waiting for final culture results.

## 2019-10-22 ENCOUNTER — OFFICE VISIT (OUTPATIENT)
Dept: ORTHOPEDIC SURGERY | Age: 29
End: 2019-10-22

## 2019-10-22 VITALS
HEIGHT: 64 IN | DIASTOLIC BLOOD PRESSURE: 91 MMHG | RESPIRATION RATE: 14 BRPM | OXYGEN SATURATION: 97 % | TEMPERATURE: 97.4 F | BODY MASS INDEX: 25.92 KG/M2 | HEART RATE: 107 BPM | SYSTOLIC BLOOD PRESSURE: 131 MMHG

## 2019-10-22 DIAGNOSIS — M65.9 SYNOVITIS OF LEFT ANKLE: Primary | ICD-10-CM

## 2019-10-22 DIAGNOSIS — Z98.890 POST-OPERATIVE STATE: ICD-10-CM

## 2019-10-22 DIAGNOSIS — Z01.818 PRE-OPERATIVE EXAMINATION: ICD-10-CM

## 2019-10-22 RX ORDER — HYDROCODONE BITARTRATE AND ACETAMINOPHEN 7.5; 325 MG/1; MG/1
1-2 TABLET ORAL
Qty: 42 TAB | Refills: 0 | Status: SHIPPED | OUTPATIENT
Start: 2019-10-26 | End: 2019-11-02

## 2019-10-22 NOTE — PROGRESS NOTES
1. Have you been to the ER, urgent care clinic since your last visit? Hospitalized since your last visit? No    2. Have you seen or consulted any other health care providers outside of the [address] [place] [address] since your last visit? Include any pap smears or colon screening.  No

## 2019-10-22 NOTE — PROGRESS NOTES
Patient: Weston Matos                MRN: [id number]       SSN: [id number]  YOB: 1990             AGE: 30 y.o. SEX: female    Alcon Calvillo PA-C      Chief Complaint:   Chief Complaint   Patient presents with    Ankle Pain     left ankle pain       HPI:     The patient is a 30 y.o. female who presents today for follow up 5 days s/p: Left ankle arthroscopic lavage, synovial biopsy and cultures     DOS: 10/17/19    Patient has been NWB to the left lower extremity. Patient reports doing well other than post op pain. Patient denies any fever, chills, chest pain, shortness of breath or calf pain. Patient reports a slight slip with the crutches and had to apply some weight to her toes. Otherwise, there are no new illness or injuries to report since last seen in the office. The pathology report was reviewed with the patient, but we are awaiting the final cultures. No ABX required at this time. ·   PHYSICAL EXAM:     Visit Vitals  BP (!) 131/91   Pulse (!) 107   Temp 97.4 °F (36.3 °C) (Oral)   Resp 14   Ht 5' 4\" (1.626 m)   SpO2 97%   BMI 25.92 kg/m²         Pain Scale: 4/10    Pain Assessment  10/22/2019   Location of Pain Ankle   Location Modifiers Left   Severity of Pain 4   Quality of Pain Throbbing   Duration of Pain Persistent   Frequency of Pain Constant   Aggravating Factors -   Limiting Behavior -   Relieving Factors Elevation;Heat;Ice;Rest   Result of Injury Yes   Work-Related Injury -   Type of Injury -        Pain Location: Ankle      GEN:  Alert, well developed, well nourished, well appearing 30 y.o. female seated comfortably in no acute distress. Speech normal in context and clarity. Psychiatric: Affect, mood and conduct are appropriate.  Alert, oriented x 3 alert, oriented x 3, memory grossly intact, no dementia  Patient arrives to office via: with assistive device: knee roller   Patient accompanied in the examination room by: self    M/S EXAMINATION OF: left foot/ankle  DRESSINGS: CDI   DRAINAGE: none  INCISION: Incision looks good, skin well approximated, no dehiscence, nylon sutures in place without disruption at portal sites. SKIN: mild edema , no erythema, no  ecchymosis, no warmth    TENDERNESS:  mild tenderness to palpation   NEUROVASCULAR:  grossly intact. Positive distal pulses and capillary refill. DVT ASSESSMENT:  The calf is not tender to palpation. No evidence of DVT seen on physical exam.  ROM: not tested                  [address] [place]   [address] [address] , [address]     [phone] FAX# [phone] FAX# [phone] FAX# [phone]     SURGICAL PATHOLOGY REPORT Patient:  Maya Saldaña  Specimen #:  [id number]    Age:  1990 (Age: 30)  Date of Procedure:  10/17/2019    Sex:  F  Date of Receipt:  10/18/2019    Hospital #:  [id number]  Date of Report:  10/21/2019    Med. Record #:  [id number]  Location:  [address]    Physician(s):  KARAN Cooper MD            IMPRESSION:     Encounter Diagnoses     ICD-10-CM ICD-9-CM   1. Synovitis of left ankle M65.9 727.06   2. Post-operative state Z98.890 V45.89       PLAN:         No orders of the defined types were placed in this encounter. There are no Patient Instructions on file for this visit. · Dressing: changed in the office today. Patient placed in soft dressing. Patient advised that she can apply the CAM boot at home without the center piece. She may PWB as tolerated. Be careful using crutches. · Keep the current dressings on and in place. You need to keep this incision clean and dry. If you have a splint or cast on please keep this clean and dry as well.     · You should expect swelling and bruising in the area over the next several days. You may elevate the surgical extremity on 1 pillow. Place the pillow horizontal so that no pressure is on the back of your heel. You may apply an icepack on top of the dressing to help minimize the swelling. · Keep all pets away from  any wound present in order to prevent infection. · Continue activity modification    · Weight bearing status:  non weight bearing to the surgical extremity    · No lifting, twisting, squatting, deep bending, driving or strenuous activity. PLEASE SEEK IMMEDIATE ASSESSMENT BY ER PHYSICIAN IF ANY OF THE FOLLOWING EXIST:      Excessive pain, swelling, redness or odor of or around the surgical area    Temperature over 100.5    Nausea and vomiting lasting longer than 4 hours or if unable to take medications    Any signs of decreased circulation or nerve impairment to extremity: change in color, persistent numbness, tingling, coldness or increase pain    If any calf pain, calf tightness, shortness of breath, chest pain    Any difficulty breathing at rest or with ambulation, any chest tightness/soreness   Severe intractable pain, persistent swelling or drainage, development of a wound, incisional redness, finger/toe swelling or color changes, or CALF PAIN    · Perform ankle pumps with non-surgical/non-injured extremity to help reduce the risk of blood clots    · Please follow up in 2 weeks for suture removal    · Maintain proper Nutrition    · Take a multivitamin, calcium + Vitamin D supplement daily (if tolerated)    · If you are a current smoker, quit or limit smoking    · Please take medication as directed    Rakesh Gary presents today for post operative follow up and pain that is secondary to post operative state. Since surgery, the patient's pain has not changed. Patient describes the pain as aching, pulsating, throbbing. Since surgery, the patient is not able to perform normal daily activities.   Patient reports the following adverse side effects from treatment: constipation. Today - Pain Scale: 4/10    Prior records: N/A - post op  Personal or family history of psychiatric, addiction, or substance abuse: no    Aberrant behaviors: None.  reviewed: n/a. Post Op       Patient has been discussed with Dr. Freya Jurado during this visit and he agrees with the assessment and plan    Patient expresses understanding of the plan. Patient education provided on post surgical care. REVIEW OF SYSTEMS:     Review of Systems: Chest pain: no  Shortness of breath: no  Fever: no  Night sweats: no  Weight loss: no  Constitutional signs: no  Review of all other systems is negative    Otherwise as noted in HPI      PAST MEDICAL HISTORY:     Past Medical History:   Diagnosis Date    Anxiety     Depression     Depression     Eating disorder     Endometriosis     Multiple body piercings     Nausea & vomiting     PONV (postoperative nausea and vomiting)     SBO [facility]small bowel obstruction) ([facility])        MEDICATIONS:     Current Outpatient Medications   Medication Sig    ALPRAZolam (XANAX) 1 mg tablet TAKE 1 TABLET BY MOUTH TWICE A DAY AS NEEDED FOR ANXIETY    busPIRone (BUSPAR) 7.5 mg tablet TAKE 2 TABLETS BY MOUTH 3 TIMES A DAY    ondansetron hcl (ZOFRAN) 8 mg tablet Take 1 Tab by mouth every eight (8) hours as needed for Nausea.  famotidine (PEPCID) 40 mg tablet TAKE 1 TABLET BY MOUTH EVERY DAY    meloxicam (MOBIC) 15 mg tablet Take 1 Tab by mouth daily (with breakfast).  famotidine (PEPCID) 40 mg tablet Take 1 Tab by mouth daily.  ondansetron (ZOFRAN ODT) 8 mg disintegrating tablet Take 1 Tab by mouth every eight (8) hours as needed for Nausea.  multivitamin (ONE A DAY) tablet Take 1 Tab by mouth daily.  levonorgestrel-ethinyl estradiol (LARISSIA) 0.1-20 mg-mcg tab Take  by mouth. No current facility-administered medications for this visit.         ALLERGIES:     Allergies   Allergen Reactions    Amoxicillin Rash         PAST SURGICAL HISTORY:     Past Surgical History:   Procedure Laterality Date    HX ANKLE FRACTURE TX Left     HX BREAST BIOPSY Right 07/20/2018    Ultrasound Biopsy    HX BREAST BIOPSY Right 8/15/2019    Excisional Biopsy of right Breast Mass [facility]    HX PELVIC LAPAROSCOPY      HX WISDOM TEETH EXTRACTION         SOCIAL HISTORY:     Social History     Socioeconomic History    Marital status: SINGLE     Spouse name: Not on file    Number of children: Not on file    Years of education: Not on file    Highest education level: Not on file   Occupational History    Not on file   Social Needs    Financial resource strain: Not on file    Food insecurity:     Worry: Not on file     Inability: Not on file    Transportation needs:     Medical: Not on file     Non-medical: Not on file   Tobacco Use    Smoking status: Current Every Day Smoker     Packs/day: 0.50     Types: Cigarettes    Smokeless tobacco: Never Used   Substance and Sexual Activity    Alcohol use:  Yes     Alcohol/week: 2.0 standard drinks     Types: 2 Shots of liquor per week    Drug use: No    Sexual activity: Yes     Birth control/protection: Pill   Lifestyle    Physical activity:     Days per week: Not on file     Minutes per session: Not on file    Stress: Not on file   Relationships    Social connections:     Talks on phone: Not on file     Gets together: Not on file     Attends Jainism service: Not on file     Active member of club or organization: Not on file     Attends meetings of clubs or organizations: Not on file     Relationship status: Not on file    Intimate partner violence:     Fear of current or ex partner: Not on file     Emotionally abused: Not on file     Physically abused: Not on file     Forced sexual activity: Not on file   Other Topics Concern    Not on file   Social History Narrative    Not on file       FAMILY HISTORY:     Family History Problem Relation Age of Onset    Cancer Maternal Grandmother 61        colon    Cancer Maternal Grandfather 61        colon    Heart Disease Maternal Grandfather     Cancer Paternal Grandmother         breast cancer    Breast Cancer Paternal Grandmother         aged 46s    Colon Cancer Paternal Grandmother     Cancer Paternal Grandfather     Breast Cancer Paternal Aunt     Breast Cancer Paternal Aunt     Breast Cancer Paternal Aunt     Breast Cancer Paternal Aunt     Depression Mother     Anxiety Mother     Hypertension Father            Elmira Guevara  10/22/2019

## 2019-10-22 NOTE — PATIENT INSTRUCTIONS
· Dressing: changed in the office today. Patient placed in soft dressing    · Keep the current dressings on and in place. You need to keep this incision clean and dry. If you have a splint or cast on please keep this clean and dry as well. · You should expect swelling and bruising in the area over the next several days. You may elevate the surgical extremity on 1 pillow. Place the pillow horizontal so that no pressure is on the back of your heel. You may apply an icepack on top of the dressing to help minimize the swelling. · Keep all pets away from  any wound present in order to prevent infection. · Continue activity modification    · Weight bearing status:  non weight bearing to the surgical extremity    · No lifting, twisting, squatting, deep bending, driving or strenuous activity.     PLEASE SEEK IMMEDIATE ASSESSMENT BY ER PHYSICIAN IF ANY OF THE FOLLOWING EXIST:      Excessive pain, swelling, redness or odor of or around the surgical area    Temperature over 100.5    Nausea and vomiting lasting longer than 4 hours or if unable to take medications    Any signs of decreased circulation or nerve impairment to extremity: change in color, persistent numbness, tingling, coldness or increase pain    If any calf pain, calf tightness, shortness of breath, chest pain    Any difficulty breathing at rest or with ambulation, any chest tightness/soreness   Severe intractable pain, persistent swelling or drainage, development of a wound, incisional redness, finger/toe swelling or color changes, or CALF PAIN    · Perform ankle pumps with non-surgical/non-injured extremity to help reduce the risk of blood clots    · Please follow up in 2 weeks     · Maintain proper Nutrition    · Take a multivitamin, calcium + Vitamin D supplement daily (if tolerated)    · If you are a current smoker, quit or limit smoking    · Please take medication as directed

## 2019-11-05 ENCOUNTER — OFFICE VISIT (OUTPATIENT)
Dept: ORTHOPEDIC SURGERY | Age: 29
End: 2019-11-05

## 2019-11-05 VITALS
SYSTOLIC BLOOD PRESSURE: 130 MMHG | BODY MASS INDEX: 25.78 KG/M2 | HEIGHT: 64 IN | DIASTOLIC BLOOD PRESSURE: 68 MMHG | OXYGEN SATURATION: 99 % | HEART RATE: 100 BPM | WEIGHT: 151 LBS | TEMPERATURE: 98.3 F | RESPIRATION RATE: 12 BRPM

## 2019-11-05 DIAGNOSIS — M65.9 SYNOVITIS OF LEFT ANKLE: Primary | ICD-10-CM

## 2019-11-05 DIAGNOSIS — Z98.890 POST-OPERATIVE STATE: ICD-10-CM

## 2019-11-05 NOTE — PATIENT INSTRUCTIONS
· Dressing: Suture removed in the office today. You may shower, no submerging in any water    · Keep all pets away from  any wound present in order to prevent infection. · Continue activity modification    · Weight bearing status: weight bearing to the surgical extremity in CAM boot    · No lifting, twisting, squatting, deep bending, driving or strenuous activity.     · Perform ankle pumps with non-surgical/non-injured extremity to help reduce the risk of blood clots    · Work note provided to return to full duty wearing the CAM boot    · Please follow up in 3 weeks

## 2019-11-05 NOTE — LETTER
NOTIFICATION RETURN TO WORK / SCHOOL 
 
11/5/2019 3:09 PM 
 
Ms. Andrew Gar [facility] [facility] [address] To Whom It May Concern: 
 
Andrew Gar is currently under the care of Marysol Juárez. She will return to work at full duty starting Monday, November 11. She must be able to wear her walking boot. If there are questions or concerns please have the patient contact our office. Sincerely, Anitra Shelby PA-C

## 2019-11-05 NOTE — PROGRESS NOTES
1. Have you been to the ER, urgent care clinic since your last visit? Hospitalized since your last visit? No    2. Have you seen or consulted any other health care providers outside of the [address] [place] [address] since your last visit? Include any pap smears or colon screening.  No

## 2019-11-05 NOTE — PROGRESS NOTES
Patient: Kelsey Montero                MRN: [id number]       SSN: [id number]  YOB: 1990                       AGE: 30 y.o. SEX: female    Caridad Davalos PA-C      Chief Complaint:   Chief Complaint   Patient presents with    Ankle Pain     LEFT ANKLE PAIN       HPI:     The patient is a 30 y.o. female who presents today for follow up 19 days s/p: Left ankle arthroscopic lavage, synovial biopsy and cultures     DOS: 10/17/19    Patient has been NWB to the left lower extremity. Patient reports doing well other than post op pain. Patient denies any fever, chills, chest pain, shortness of breath or calf pain. Patient reports doing well and she has no new illness or injuries to report since last seen in the office. The pathology report was reviewed with the patient and cultures revealed no growth. Patient states that she is ready to return to work. She states that she can return with the CAM boot. PHYSICAL EXAM:     Visit Vitals  /68   Pulse 100   Temp 98.3 °F (36.8 °C) (Oral)   Resp 12   Ht 5' 4\" (1.626 m)   Wt 151 lb (68.5 kg)   SpO2 99%   BMI 25.92 kg/m²         Pain Scale: 3/10    Pain Assessment  11/5/2019   Location of Pain Ankle   Location Modifiers Left   Severity of Pain 3   Quality of Pain Throbbing   Duration of Pain Persistent   Frequency of Pain Constant   Aggravating Factors Standing;Walking   Limiting Behavior -   Relieving Factors Rest;Elevation   Result of Injury Yes   Work-Related Injury No   Type of Injury -        Pain Location: Ankle      GEN:  Alert, well developed, well nourished, well appearing 30 y.o. female seated comfortably in no acute distress. Speech normal in context and clarity. Psychiatric: Affect, mood and conduct are appropriate.  Alert, oriented x 3 alert, oriented x 3, memory grossly intact, no dementia  Patient arrives to office via: with assistive device: knee roller   Patient accompanied in the examination room by: self    M/S EXAMINATION OF: left foot/ankle  DRESSINGS: CDI   DRAINAGE: none  INCISION: Incision looks good, skin well approximated, no dehiscence, nylon sutures in place without disruption at portal sites. SKIN: mild edema , no erythema, no  ecchymosis, no warmth    TENDERNESS:  mild tenderness to palpation   NEUROVASCULAR:  grossly intact. Positive distal pulses and capillary refill. DVT ASSESSMENT:  The calf is not tender to palpation. No evidence of DVT seen on physical exam.  ROM: not tested                  [address] [place] [address] Rd   [address] [facility] , [address]     [phone] FAX# [phone] FAX# [phone] FAX# [phone]     SURGICAL PATHOLOGY REPORT Patient:  Saad Adams  Specimen #:  [id number]    Age:  1990 (Age: 30)  Date of Procedure:  10/17/2019    Sex:  F  Date of Receipt:  10/18/2019    Hospital #:  [id number]  Date of Report:  10/21/2019    Med. Record #:  [id number]  Location:  [address]    Physician(s):  KARAN Herndon MD            IMPRESSION:     Encounter Diagnoses     ICD-10-CM ICD-9-CM   1. Synovitis of left ankle M65.9 727.06   2. Post-operative state Z98.890 V45.89       PLAN:         No orders of the defined types were placed in this encounter. · Dressing: Suture removed in the office today. You may shower, no submerging in any water    · Keep all pets away from  any wound present in order to prevent infection. · Continue activity modification    · Weight bearing status: weight bearing to the surgical extremity in CAM boot    · No lifting, twisting, squatting, deep bending, driving or strenuous activity.     · Perform ankle pumps with non-surgical/non-injured extremity to help reduce the risk of blood clots    · Work note provided to return to full duty wearing the CAM boot    · Please follow up in 3 weeks               Patient has been discussed with Dr. Nithin Hernandez during this visit and he agrees with the assessment and plan    Patient expresses understanding of the plan. Patient education provided on post surgical care. REVIEW OF SYSTEMS:     Review of Systems: Chest pain: no  Shortness of breath: no  Fever: no  Night sweats: no  Weight loss: no  Constitutional signs: no  Review of all other systems is negative    Otherwise as noted in HPI      PAST MEDICAL HISTORY:     Past Medical History:   Diagnosis Date    Anxiety     Depression     Depression     Eating disorder     Endometriosis     Multiple body piercings     Nausea & vomiting     PONV (postoperative nausea and vomiting)     SBO (small bowel obstruction) ([facility])        MEDICATIONS:     Current Outpatient Medications   Medication Sig    ALPRAZolam (XANAX) 1 mg tablet TAKE 1 TABLET BY MOUTH TWICE A DAY AS NEEDED FOR ANXIETY    busPIRone (BUSPAR) 7.5 mg tablet TAKE 2 TABLETS BY MOUTH 3 TIMES A DAY    ondansetron hcl (ZOFRAN) 8 mg tablet Take 1 Tab by mouth every eight (8) hours as needed for Nausea.  famotidine (PEPCID) 40 mg tablet TAKE 1 TABLET BY MOUTH EVERY DAY    meloxicam (MOBIC) 15 mg tablet Take 1 Tab by mouth daily (with breakfast).  famotidine (PEPCID) 40 mg tablet Take 1 Tab by mouth daily.  ondansetron (ZOFRAN ODT) 8 mg disintegrating tablet Take 1 Tab by mouth every eight (8) hours as needed for Nausea.  multivitamin (ONE A DAY) tablet Take 1 Tab by mouth daily.  levonorgestrel-ethinyl estradiol (LARISSIA) 0.1-20 mg-mcg tab Take  by mouth. No current facility-administered medications for this visit.         ALLERGIES:     Allergies   Allergen Reactions    Amoxicillin Rash         PAST SURGICAL HISTORY:     Past Surgical History:   Procedure Laterality Date    HX ANKLE FRACTURE TX Left     HX BREAST BIOPSY Right 07/20/2018    Ultrasound Biopsy    HX BREAST BIOPSY Right 8/15/2019    Excisional Biopsy of right Breast Mass performed by Owen Enamorado MD at [facility] MAIN OR    HX PELVIC LAPAROSCOPY      HX WISDOM TEETH EXTRACTION         SOCIAL HISTORY:     Social History     Socioeconomic History    Marital status: SINGLE     Spouse name: Not on file    Number of children: Not on file    Years of education: Not on file    Highest education level: Not on file   Occupational History    Not on file   Social Needs    Financial resource strain: Not on file    Food insecurity:     Worry: Not on file     Inability: Not on file    Transportation needs:     Medical: Not on file     Non-medical: Not on file   Tobacco Use    Smoking status: Current Every Day Smoker     Packs/day: 0.50     Types: Cigarettes    Smokeless tobacco: Never Used   Substance and Sexual Activity    Alcohol use:  Yes     Alcohol/week: 2.0 standard drinks     Types: 2 Shots of liquor per week    Drug use: No    Sexual activity: Yes     Birth control/protection: Pill   Lifestyle    Physical activity:     Days per week: Not on file     Minutes per session: Not on file    Stress: Not on file   Relationships    Social connections:     Talks on phone: Not on file     Gets together: Not on file     Attends Jehovah's witness service: Not on file     Active member of club or organization: Not on file     Attends meetings of clubs or organizations: Not on file     Relationship status: Not on file    Intimate partner violence:     Fear of current or ex partner: Not on file     Emotionally abused: Not on file     Physically abused: Not on file     Forced sexual activity: Not on file   Other Topics Concern    Not on file   Social History Narrative    Not on file       FAMILY HISTORY:     Family History   Problem Relation Age of Onset    Cancer Maternal Grandmother 61        colon    Cancer Maternal Grandfather 61        colon    Heart Disease Maternal Grandfather     Cancer Paternal Grandmother         breast cancer    Breast Cancer Paternal Grandmother         aged 46s    Colon Cancer Paternal Grandmother     Cancer Paternal Grandfather     Breast Cancer Paternal Aunt     Breast Cancer Paternal Aunt     Breast Cancer Paternal Aunt     Breast Cancer Paternal Aunt     Depression Mother     Anxiety Mother     Hypertension Father            oJdi Phillips, Mercedes Poles  11/5/2019

## 2019-11-06 VITALS
DIASTOLIC BLOOD PRESSURE: 69 MMHG | SYSTOLIC BLOOD PRESSURE: 109 MMHG | OXYGEN SATURATION: 95 % | RESPIRATION RATE: 18 BRPM | TEMPERATURE: 98 F | HEART RATE: 72 BPM

## 2019-11-08 ENCOUNTER — TELEPHONE (OUTPATIENT)
Dept: ORTHOPEDIC SURGERY | Age: 29
End: 2019-11-08

## 2019-11-08 LAB
RESULT: NORMAL

## 2019-11-08 NOTE — TELEPHONE ENCOUNTER
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 Patient had surgery 10/17/2019  LT ANKLE ARTHROSCOPYSYNOVECTOMY PARTIAL sathya needs to state; Patient can return to work [employer] [facility] [employer] [facility] [employer] Nov 11th and needs to wear the boot for the next 3 weeks till her f/u appt Mon Nov 25th. Please  Fax to [address] [facility] [address] fax [phone]. Please call rolanda back when letter is done at [phone].

## 2019-11-08 NOTE — LETTER
NOTIFICATION RETURN TO WORK / SCHOOL 
 
11/8/2019 9:04 AM 
 
Ms. Andrew Gar [address] [facility] [address] To Whom It May Concern: 
 
Andrew Gar is currently under the care of [facility] [place] [facility] Juárez. Patient can return to work Monday, 11-11-19. Ms. Abrahan Jaramillo will need to wear the CAM boot for the next 3 weeks. She will follow up on Monday, 11-25-19. If there are questions or concerns please have the patient contact our office.  
 
 
 
Sincerely, 
 
 
Karan Roberts MD

## 2019-11-25 ENCOUNTER — OFFICE VISIT (OUTPATIENT)
Dept: ORTHOPEDIC SURGERY | Age: 29
End: 2019-11-25

## 2019-11-25 VITALS
SYSTOLIC BLOOD PRESSURE: 121 MMHG | WEIGHT: 151 LBS | HEIGHT: 64 IN | BODY MASS INDEX: 25.78 KG/M2 | TEMPERATURE: 98.9 F | HEART RATE: 98 BPM | OXYGEN SATURATION: 100 % | DIASTOLIC BLOOD PRESSURE: 71 MMHG

## 2019-11-25 DIAGNOSIS — M65.9 SYNOVITIS OF LEFT ANKLE: Primary | ICD-10-CM

## 2019-11-25 RX ORDER — FAMOTIDINE 40 MG/1
40 TABLET, FILM COATED ORAL DAILY
Qty: 30 TAB | Refills: 0 | Status: SHIPPED | OUTPATIENT
Start: 2019-11-25

## 2019-11-25 RX ORDER — MELOXICAM 15 MG/1
15 TABLET ORAL
Qty: 30 TAB | Refills: 0 | Status: SHIPPED | OUTPATIENT
Start: 2019-11-25

## 2019-11-25 NOTE — PROGRESS NOTES
AMBULATORY PROGRESS NOTE      Patient: Susana Vance             MRN: [id number]     SSN: [id number] Body mass index is 25.92 kg/m². YOB: 1990     AGE: 30 y.o. SEX: female    PCP: Flores Harper PA-C     IMPRESSION/DIAGNOSIS AND TREATMENT PLAN     DIAGNOSES  1. Synovitis of left ankle        Orders Placed This Encounter    AMB SUPPLY ORDER    REFERRAL TO ORTHOPEDICS    meloxicam (MOBIC) 15 mg tablet    famotidine (PEPCID) 40 mg tablet      Jane Bell understands her diagnoses and the proposed plan. She still has some swelling in her ankle. She states it is better than it was previously, but she still is in the CAM walker boot. I am going to wean her from the CAM walker boot. Additional plans are listed as below. She is getting ready to move to [place] in the next two to three weeks. The last I heard is 2019, she tells me. She clearly needs to be followed by another orthopedic foot and ankle surgeon. Once we get the name of that surgeon, we will release the medical records. On examination, there is some swelling to her ankle. It is not hot, not red, and not erythematous, but there is some swelling to her ankle, improved compared to preop. Peripheral pulses are intact. Toes are nice and warm and pink. There is no gross instability of her ankle. Plan:    1) DME Order: Left AS/AC brace. 2) Mobic 15 m PO every day; 30 tablets, 0 refills. 3) Pepcid 40 m PO every day; 30 tablets, 0 refills. 4) Continue activity modification as directed. 5) Anticipate aspiration if condition does not improve. 6) Referral to Orthopedic surgeon in [place], [place].      RTO - 3 weeks     HPI AND EXAMINATION     Jane Bell IS A 30 y.o. female who presents to my outpatient office for follow up 39 days s/p:     Left ankle arthroscopic lavage, synovial biopsy and cultures   DOS: 10/17/19    At the last visit, Drew Seymour GAIL, removed sutures in the office, provided a work note, instructed the patient to continue activity modification as directed, to keep pets away from the surgical site, to remain WBAT in the CAM boot, and to perform ankle pumps. Since we saw her last, Ms. Gisselle Sagastume reports that she is still experiencing pain and swelling in her left lateral ankle. She notes that her ankle is almost as swollen as it was prior to surgery. However, she notes that her pain is not as bad as it was before surgery. She describes the pain as a throbbing tightness. She notes that she never experienced swelling like this before her surgeries. She reports that she has not yet been out of her CAM walker boot. She recalls that she has received multiple Cortisone injections to her ankle in the past by Dr. Emili Lynn, which she notes only lasted for a few weeks before the swelling would start again. The patient denies any GI issues or bariatric procedures. She is not currently taking blood thinners. Of note, the patient will be moving to [place], [place] on December 19th. Visit Vitals  /71   Pulse 98   Temp 98.9 °F (37.2 °C) (Oral)   Ht 5' 4\" (1.626 m)   Wt 151 lb (68.5 kg)   SpO2 100%   BMI 25.92 kg/m²     GEN:  Alert, well developed, well nourished, well appearing 30 y.o. female seated comfortably in no acute distress. Speech normal in context and clarity. Psychiatric: Affect, mood and conduct are appropriate. Alert, oriented x 3 alert, oriented x 3, memory grossly intact, no dementia  Patient arrives to office via: with assistive device: CAM boot  Patient accompanied in the examination room by: self  M/S EXAMINATION OF: left foot/ankle  DRESSINGS: none  DRAINAGE: none  INCISION: Incision looks good, skin well approximated, no dehiscence, nylon sutures in place without disruption at portal sites. SKIN: mild edema , no erythema, no  ecchymosis, no warmth    TENDERNESS:  mild tenderness to palpation   NEUROVASCULAR:  grossly intact. Positive distal pulses and capillary refill. DVT ASSESSMENT:  The calf is not tender to palpation. No evidence of DVT seen on physical exam.  ROM: WNL  STABILITY: No instability noted. CHART REVIEW     Past Medical History:   Diagnosis Date    Anxiety     Depression     Depression     Eating disorder     Endometriosis     Multiple body piercings     Nausea & vomiting     PONV (postoperative nausea and vomiting)     SBO (small bowel obstruction) ([facility])      Current Outpatient Medications   Medication Sig    meloxicam (MOBIC) 15 mg tablet Take 1 Tab by mouth daily (with breakfast).  famotidine (PEPCID) 40 mg tablet Take 1 Tab by mouth daily.  ALPRAZolam (XANAX) 1 mg tablet TAKE 1 TABLET BY MOUTH TWICE A DAY AS NEEDED FOR ANXIETY    busPIRone (BUSPAR) 7.5 mg tablet TAKE 2 TABLETS BY MOUTH 3 TIMES A DAY    ondansetron hcl (ZOFRAN) 8 mg tablet Take 1 Tab by mouth every eight (8) hours as needed for Nausea.  famotidine (PEPCID) 40 mg tablet TAKE 1 TABLET BY MOUTH EVERY DAY    meloxicam (MOBIC) 15 mg tablet Take 1 Tab by mouth daily (with breakfast).  famotidine (PEPCID) 40 mg tablet Take 1 Tab by mouth daily.  ondansetron (ZOFRAN ODT) 8 mg disintegrating tablet Take 1 Tab by mouth every eight (8) hours as needed for Nausea.  multivitamin (ONE A DAY) tablet Take 1 Tab by mouth daily.  levonorgestrel-ethinyl estradiol (LARISSIA) 0.1-20 mg-mcg tab Take  by mouth. No current facility-administered medications for this visit.       Allergies   Allergen Reactions    Amoxicillin Rash     Past Surgical History:   Procedure Laterality Date    HX ANKLE FRACTURE TX Left     HX BREAST BIOPSY Right 07/20/2018    Ultrasound Biopsy    HX BREAST BIOPSY Right 8/15/2019    Excisional Biopsy of right Breast Mass performed by Garald Primrose, MD [facility] MA[facility] OR    HX PELVIC LAPAROSCOPY      HX WISDOM TEETH EXTRACTION       Social History     Occupational History    Not on file   Tobacco Use  Smoking status: Current Every Day Smoker     Packs/day: 0.50     Types: Cigarettes    Smokeless tobacco: Never Used   Substance and Sexual Activity    Alcohol use: Yes     Alcohol/week: 2.0 standard drinks     Types: 2 Shots of liquor per week    Drug use: No    Sexual activity: Yes     Birth control/protection: Pill     Family History   Problem Relation Age of Onset    Cancer Maternal Grandmother 61        colon    Cancer Maternal Grandfather 61        colon    Heart Disease Maternal Grandfather     Cancer Paternal Grandmother         breast cancer    Breast Cancer Paternal Grandmother         aged 46s    Colon Cancer Paternal Grandmother     Cancer Paternal Grandfather     Breast Cancer Paternal Aunt     Breast Cancer Paternal Aunt     Breast Cancer Paternal Aunt     Breast Cancer Paternal Aunt     Depression Mother     Anxiety Mother     Hypertension Father         REVIEW OF SYSTEMS : 11/25/2019  ALL BELOW ARE Negative except : SEE HPI     Constitutional: Negative for fever, chills and weight loss. Neg Weight Loss  Cardiovascular: Negative for chest pain, claudication and leg swelling. SOB, MADDOX   Gastrointestinal/Urological: Negative for  pain, N/V/D/C, Blood in stool or urine,dysuria                         Hematuria, Incontinence, pelvic pain  Musculoskeletal: see HPI. Neurological: Negative for dizziness and weakness, headaches,Visual Changes             Confusion,  Or Seizures,   Psychiatric/Behavioral: Negative for depression, memory loss and substance abuse. Extremities:  Negative for hair changes, rash or skin lesion changes. Hematologic: Negative for Bleeding problems, bruising, pallor or swollen lymph nodes. Peripheral Vascular: No calf pain, vascular vein tenderness to calf pain              No calf throbbing, posterior knee throbbing pain     DIAGNOSTIC IMAGING     No notes on file    Please see above section of this report.     I have personally reviewed the results of the above study. The interpretation of this study is my professional opinion. Written by Joe Nguyen, as dictated by Dr. Parrish Irwin. I, Dr. Parrish Irwin, confirm that all documentation is accurate.

## 2019-11-25 NOTE — PROGRESS NOTES
1. Have you been to the ER, urgent care clinic since your last visit? Hospitalized since your last visit? No    2. Have you seen or consulted any other health care providers outside of the [address] [place] [address] since your last visit? Include any pap smears or colon screening.  No

## 2019-12-16 ENCOUNTER — OFFICE VISIT (OUTPATIENT)
Dept: ORTHOPEDIC SURGERY | Age: 29
End: 2019-12-16

## 2019-12-16 VITALS
HEIGHT: 64 IN | SYSTOLIC BLOOD PRESSURE: 127 MMHG | TEMPERATURE: 97.2 F | HEART RATE: 115 BPM | OXYGEN SATURATION: 100 % | DIASTOLIC BLOOD PRESSURE: 75 MMHG | BODY MASS INDEX: 26.26 KG/M2 | RESPIRATION RATE: 15 BRPM | WEIGHT: 153.8 LBS

## 2019-12-16 DIAGNOSIS — M65.9 SYNOVITIS OF LEFT ANKLE: Primary | ICD-10-CM

## 2019-12-16 NOTE — PROGRESS NOTES
AMBULATORY PROGRESS NOTE      Patient: Rony Centeno             MRN: [id number]     SSN: [id number] Body mass index is 26.4 kg/m². YOB: 1990     AGE: 30 y.o. SEX: female    PCP: Nino Rogers PA-C     IMPRESSION/DIAGNOSIS AND TREATMENT PLAN     DIAGNOSES  1. Synovitis of left ankle        Orders Placed This Encounter    DRAIN/INJECT INTERMEDIATE JOINT/BURSA      Jane Ruth understands her diagnoses and the proposed plan. As such, in this individual who continues to have recurrent effusions to her ankle and who in August 2019 had an aspiration of her ankle, and it showed numerous crystals, recommendation will be for repeat aspiration. It is to be noted that her recent surgery in October did not show any bacterial infection. It did not show any fungal growth or fungal infection, but it did show some hypertrophic synovitis along the anterolateral aspect of the ankle, and this was thoroughly debrided and excised and sent to pathology for histopathologic analysis. She is getting ready to move to [place]. She is going to go up to the [facility] area for a few weeks to be with her family prior to her moving to [place]. It is to be recalled she had surgery by Dr. Melinda Curry at [facility] on three occasions to her ankle. She had arthroscopic washout of her ankle or arthroscopic lavage of her ankle, as well as arthroscopic focal debridement at the anterolateral corner of the ankle removing hypertrophic synovitis. This surgery was done in October 2019. The plan is listed as below. I anticipate she is going to require a rheumatologic referral to help with this persistent ankle swelling. I did not see any foreign material or foreign body in her ankle when I did her arthroscopic surgery. She was given copies of all of her pathology reports, as well as her OP report.        Plan:    1) Aspiration of the left lateral ankle performed in the office today. 2) Begin wearing the CAM walker boot as directed. 3) Continue activity modification as directed. 4) Anticipate referral to rheumatology. 5) Discontinue taking Mobic as directed. RTO - PRN // follow up with orthopaedic specialist in [place]     HPI [address] [place] [address] Avenue A 30 y.o. female who presents to my outpatient office for follow up 60 days s/p:     Left ankle arthroscopic lavage, synovial biopsy and cultures   DOS: 10/17/19    At the last visit, I prescribed Mobic 15 mg and Pepcid 40 mg, provided an order for a left AS/AC brace, provided a referral to an orthopedic surgeon in [place], instructed the patient to continue activity modification as directed, and to anticipate an aspiration if condition does not improve. Since we saw her last, Ms. Bonnie White states that she is still experiencing pain and swelling in her left lateral ankle. She notes that the swelling she is experiencing today is significantly better than it was over the weekend. The patient denies any fever, shakes, or chills. She notes that she has been taking Mobic daily, which she states has not helped with her pain. She reports that she is experiencing numbness in her great toe and #2 toe at the tips of her toes, which she notes is new. She recalls that she has had 3 surgeries on her left ankle in the past by Dr. Emi Phelan. She reports that she will be leaving for [place] later this month, after Christmas, which she states she will spend in [place]. Visit Vitals  /75 (BP 1 Location: Left arm, BP Patient Position: Sitting)   Pulse (!) 115   Temp 97.2 °F (36.2 °C) (Oral)   Resp 15   Ht 5' 4\" (1.626 m)   Wt 153 lb 12.8 oz (69.8 kg)   SpO2 100%   BMI 26.40 kg/m²     GEN:  Alert, well developed, well nourished, well appearing 30 y.o. female seated comfortably in no acute distress. Speech normal in context and clarity. Psychiatric: Affect, mood and conduct are appropriate.  Alert, oriented x 3 alert, oriented x 3, memory grossly intact, no dementia  Patient arrives to office via: without assistive device:   Patient accompanied in the examination room by:   M/S EXAMINATION OF: left foot/ankle  DRESSINGS: none  DRAINAGE: none  INCISION: Incision looks good, skin well approximated, no dehiscence, nylon sutures in place without disruption at portal sites. SKIN: moderate edema , no erythema, no  ecchymosis, no warmth    TENDERNESS:  mild tenderness to palpation   NEUROVASCULAR:  Positive distal pulses and capillary refill. Decreased monofilament testing to the tips of the great toe and #2 toe. DVT ASSESSMENT:  The calf is not tender to palpation. No evidence of DVT seen on physical exam.  ROM: WNL  STABILITY: No gross instability noted. CHART REVIEW     Past Medical History:   Diagnosis Date    Anxiety     Depression     Depression     Eating disorder     Endometriosis     Multiple body piercings     Nausea & vomiting     PONV (postoperative nausea and vomiting)     [facility]BO [facility] obstruction[facility]      Current Outpatient Medications   Medication Sig    meloxicam (MOBIC) 15 mg tablet Take 1 Tab by mouth daily (with breakfast).  famotidine (PEPCID) 40 mg tablet Take 1 Tab by mouth daily.  ALPRAZolam (XANAX) 1 mg tablet TAKE 1 TABLET BY MOUTH TWICE A DAY AS NEEDED FOR ANXIETY    busPIRone (BUSPAR) 7.5 mg tablet TAKE 2 TABLETS BY MOUTH 3 TIMES A DAY    ondansetron hcl (ZOFRAN) 8 mg tablet Take 1 Tab by mouth every eight (8) hours as needed for Nausea.  famotidine (PEPCID) 40 mg tablet TAKE 1 TABLET BY MOUTH EVERY DAY    meloxicam (MOBIC) 15 mg tablet Take 1 Tab by mouth daily (with breakfast).  famotidine (PEPCID) 40 mg tablet Take 1 Tab by mouth daily.  ondansetron (ZOFRAN ODT) 8 mg disintegrating tablet Take 1 Tab by mouth every eight (8) hours as needed for Nausea.  multivitamin (ONE A DAY) tablet Take 1 Tab by mouth daily.     levonorgestrel-ethinyl estradiol (LARISSIA) 0.1-20 mg-mcg tab Take  by mouth. No current facility-administered medications for this visit. Allergies   Allergen Reactions    Amoxicillin Rash     Past Surgical History:   Procedure Laterality Date    HX ANKLE FRACTURE TX Left     HX BREAST BIOPSY Right 07/20/2018    Ultrasound Biopsy    HX BREAST BIOPSY Right 8/15/2019    Excisional Biopsy of right Breast Mass performed by Leann Rivera MD at [facility] MA[facility] OR    HX PELVIC LAPAROSCOPY      HX WISDOM TEETH EXTRACTION       Social History     Occupational History    Not on file   Tobacco Use    Smoking status: Current Every Day Smoker     Packs/day: 0.50     Types: Cigarettes    Smokeless tobacco: Never Used   Substance and Sexual Activity    Alcohol use: Yes     Alcohol/week: 2.0 standard drinks     Types: 2 Shots of liquor per week    Drug use: No    Sexual activity: Yes     Birth control/protection: Pill     Family History   Problem Relation Age of Onset    Cancer Maternal Grandmother 61        colon    Cancer Maternal Grandfather 61        colon    Heart Disease Maternal Grandfather     Cancer Paternal Grandmother         breast cancer    Breast Cancer Paternal Grandmother         aged 46s    Colon Cancer Paternal Grandmother     Cancer Paternal Grandfather     Breast Cancer Paternal Aunt     Breast Cancer Paternal Aunt     Breast Cancer Paternal Aunt     Breast Cancer Paternal Aunt     Depression Mother     Anxiety Mother     Hypertension Father         REVIEW OF SYSTEMS : 12/16/2019  ALL BELOW ARE Negative except : SEE HPI     Constitutional: Negative for fever, chills and weight loss. Neg Weight Loss  Cardiovascular: Negative for chest pain, claudication and leg swelling. SOB, MADDOX   Gastrointestinal/Urological: Negative for  pain, N/V/D/C, Blood in stool or urine,dysuria                         Hematuria, Incontinence, pelvic pain  Musculoskeletal: see HPI.   Neurological: Negative for dizziness and weakness, headaches,Visual Changes             Confusion,  Or Seizures,   Psychiatric/Behavioral: Negative for depression, memory loss and substance abuse. Extremities:  Negative for hair changes, rash or skin lesion changes. Hematologic: Negative for Bleeding problems, bruising, pallor or swollen lymph nodes. Peripheral Vascular: No calf pain, vascular vein tenderness to calf pain              No calf throbbing, posterior knee throbbing pain     DIAGNOSTIC IMAGING     No notes on file    Please see above section of this report. I have personally reviewed the results of the above study. The interpretation of this study is my professional opinion. PROCEDURE       TRACEY PROCEDURE OUTPATIENT PROCEDURE    PROCEDURE:  Injection of the left ankle - left,        ILorenzo MD, have reviewed the History, Physical and updated the Allergic reactions for [employer] by [employer] performed immediately prior to start of procedure:       * Patient was identified by name Sheryle Mungo and date of birth (1990)  * Agreement on procedure being performed was verified  * Risks and Benefits explained to the patient: see below  * Procedure site verified and marked as necessary  * Patient was positioned for comfort  * Verbal Consent and Written Consent Verified by myself and my office staff. * Complications: None  *  All patient and/or family questions answered     The procedure was explained to the patient and possible adverse reactions were discussed. These risks included, but not limited to: bleeding, infection, septic arthritis, local skin irritation (skin discoloration, hyperpigmentation, hypopigmentation, thinning of the skin, development of a wound, skin necrosis), synovitis, subcutaneous fat pad atrophy, subcutaneous abscess, tendon rupture, transient hyperglycemia. Infection may occur requiring surgical debridement and hospitalization.  All Diabetics instructed to check serum blood sugar levels 3 times a day (day of the injection) due to hyperglycemia induced from cortisone injections. If serum blood sugar level > 250 mg%, Jane Richardson instructed to call PCP to determine if any additional measures are needed. Time: 12:56 PM  Date of procedure: 12/16/2019  Procedure performed by: Jacklyn Longoria MD  Provider assisted by:  MA  Patient accompanied by: spouse   How tolerated by patient: tolerated the procedure well with no complications  Comments: none    The   left ankle - left area was prepped and cleansed with: sterile fashion using a following solution:   ALCOHOL/BETADINE STERILE PREP      Aspiration was performed and the fluid retrieved had visual appearance of:yellow/viscous fluid from lateral left ankle region // no pus// no malodor,  ,discarded,    Post injection instructions were given to Elvira Hua: Remove the band aid in 3 hours, Wash site with clean soap, No further dressings there after. Call Jacklyn Longoria  [phone] if any: pain, redness, drainage, fever, or any concerns/questions that Elvira Hua may have regarding the injection. Elvira Hua was advised on the signs of infection and instructed to go to the ER if it is office after hours. Jane Adrienne Yipo tolerated the injection quite well. Jacklyn Longoria MD MD  12:56 PM    Written by Demetrice Kyle, as dictated by Dr. Katy Baron. I, Dr. Katy Baron, confirm that all documentation is accurate.

## 2019-12-16 NOTE — PROGRESS NOTES
1. Have you been to the ER, urgent care clinic since your last visit? Hospitalized since your last visit? No    2. Have you seen or consulted any other health care providers outside of the [address] [place] [address] since your last visit? Include any pap smears or colon screening.  No

## 2019-12-24 DIAGNOSIS — M65.9 SYNOVITIS OF LEFT ANKLE: ICD-10-CM

## 2019-12-27 RX ORDER — FAMOTIDINE 40 MG/1
TABLET, FILM COATED ORAL
Qty: 30 TAB | Refills: 0 | Status: SHIPPED | OUTPATIENT
Start: 2019-12-27

## 2021-08-18 NOTE — PROGRESS NOTES
Chief Complaint   Patient presents with    Hand Problem     red and itching hands and feet     Rash     on body      1. Have you been to the ER, urgent care clinic since your last visit? Hospitalized since your last visit? No    2. Have you seen or consulted any other health care providers outside of the [address] [place] [address] since your last visit? Include any pap smears or colon screening.  No Normal vision: sees adequately in most situations; can see medication labels, newsprint

## (undated) DEVICE — SUTURE VCRL SZ 3-0 L27IN ABSRB UD L26MM SH 1/2 CIR J416H

## (undated) DEVICE — SUTURE MCRYL SZ 4-0 L18IN ABSRB UD L19MM PS-2 3/8 CIR PRIM Y496G

## (undated) DEVICE — STERILE POLYISOPRENE POWDER-FREE SURGICAL GLOVES: Brand: PROTEXIS

## (undated) DEVICE — (D)PREP SKN CHLRAPRP APPL 26ML -- CONVERT TO ITEM 371833

## (undated) DEVICE — YANKAUER,FLEXIBLE HANDLE,REGLR CAPACITY: Brand: MEDLINE INDUSTRIES, INC.

## (undated) DEVICE — KIT PROC PLAS BRST CUST LF --

## (undated) DEVICE — GAUZE,SPONGE,8"X4",12PLY,XRAY,STRL,LF: Brand: MEDLINE

## (undated) DEVICE — ADHESIVE TISS CLOSURE 22X4 CM 4 CC HI VISC EXOFIN

## (undated) DEVICE — INTENDED FOR TISSUE SEPARATION, AND OTHER PROCEDURES THAT REQUIRE A SHARP SURGICAL BLADE TO PUNCTURE OR CUT.: Brand: BARD-PARKER ® CARBON RIB-BACK BLADES

## (undated) DEVICE — APPLICATOR BNDG 1MM ADH PREMIERPRO EXOFIN

## (undated) DEVICE — SUTURE ABSORBABLE BRAIDED 2-0 CT-1 27 IN UD VICRYL J259H

## (undated) DEVICE — TOWEL SURG W16XL26IN BLU NONFENESTRATED DLX ST 2 PER PK

## (undated) DEVICE — SUT SLK 2-0SH 30IN BLK --